# Patient Record
Sex: MALE | Race: BLACK OR AFRICAN AMERICAN | NOT HISPANIC OR LATINO | Employment: UNEMPLOYED | URBAN - METROPOLITAN AREA
[De-identification: names, ages, dates, MRNs, and addresses within clinical notes are randomized per-mention and may not be internally consistent; named-entity substitution may affect disease eponyms.]

---

## 2017-09-11 ENCOUNTER — TRANSCRIBE ORDERS (OUTPATIENT)
Dept: ADMINISTRATIVE | Facility: HOSPITAL | Age: 42
End: 2017-09-11

## 2017-09-11 DIAGNOSIS — R59.1 LYMPHADENOPATHY: Primary | ICD-10-CM

## 2020-10-13 ENCOUNTER — HOSPITAL ENCOUNTER (OUTPATIENT)
Facility: HOSPITAL | Age: 45
Setting detail: OBSERVATION
Discharge: HOME | End: 2020-10-14
Attending: EMERGENCY MEDICINE | Admitting: INTERNAL MEDICINE

## 2020-10-13 DIAGNOSIS — I25.10 ATHEROSCLEROTIC HEART DISEASE OF NATIVE CORONARY ARTERY WITHOUT ANGINA PECTORIS: ICD-10-CM

## 2020-10-13 DIAGNOSIS — R42 VERTIGO: Primary | ICD-10-CM

## 2020-10-13 DIAGNOSIS — I10 ESSENTIAL (PRIMARY) HYPERTENSION: ICD-10-CM

## 2020-10-13 DIAGNOSIS — I50.22 CHRONIC SYSTOLIC CONGESTIVE HEART FAILURE (HCC): ICD-10-CM

## 2020-10-13 DIAGNOSIS — I50.9 CHF (CONGESTIVE HEART FAILURE) (HCC): ICD-10-CM

## 2020-10-13 PROCEDURE — 99285 EMERGENCY DEPT VISIT HI MDM: CPT

## 2020-10-13 PROCEDURE — 93005 ELECTROCARDIOGRAM TRACING: CPT

## 2020-10-13 RX ORDER — ONDANSETRON 2 MG/ML
1 INJECTION INTRAMUSCULAR; INTRAVENOUS ONCE
Status: COMPLETED | OUTPATIENT
Start: 2020-10-14 | End: 2020-10-14

## 2020-10-13 RX ORDER — METOPROLOL TARTRATE 50 MG/1
TABLET, FILM COATED ORAL
COMMUNITY
End: 2020-10-14

## 2020-10-13 RX ORDER — ATORVASTATIN CALCIUM 10 MG/1
TABLET, FILM COATED ORAL
COMMUNITY
End: 2020-10-14

## 2020-10-13 RX ORDER — CLOPIDOGREL BISULFATE 75 MG/1
TABLET ORAL
COMMUNITY
End: 2020-10-14

## 2020-10-13 RX ORDER — CYCLOBENZAPRINE HCL 10 MG
TABLET ORAL
COMMUNITY
End: 2020-10-14

## 2020-10-14 ENCOUNTER — APPOINTMENT (OUTPATIENT)
Dept: RADIOLOGY | Facility: HOSPITAL | Age: 45
End: 2020-10-14

## 2020-10-14 ENCOUNTER — APPOINTMENT (OUTPATIENT)
Dept: NON INVASIVE DIAGNOSTICS | Facility: HOSPITAL | Age: 45
End: 2020-10-14

## 2020-10-14 VITALS
OXYGEN SATURATION: 98 % | TEMPERATURE: 97.4 F | DIASTOLIC BLOOD PRESSURE: 85 MMHG | RESPIRATION RATE: 25 BRPM | BODY MASS INDEX: 32.93 KG/M2 | WEIGHT: 230 LBS | SYSTOLIC BLOOD PRESSURE: 136 MMHG | HEIGHT: 70 IN | HEART RATE: 94 BPM

## 2020-10-14 PROBLEM — I42.8 NONISCHEMIC CARDIOMYOPATHY (HCC): Status: ACTIVE | Noted: 2019-04-12

## 2020-10-14 PROBLEM — E78.2 MIXED HYPERLIPIDEMIA: Status: ACTIVE | Noted: 2019-04-12

## 2020-10-14 PROBLEM — R42 DIZZINESS: Status: RESOLVED | Noted: 2020-10-14 | Resolved: 2020-10-14

## 2020-10-14 PROBLEM — I25.10 ATHEROSCLEROTIC HEART DISEASE OF NATIVE CORONARY ARTERY WITHOUT ANGINA PECTORIS: Status: ACTIVE | Noted: 2019-04-12

## 2020-10-14 PROBLEM — R77.8 TROPONIN LEVEL ELEVATED: Status: RESOLVED | Noted: 2020-10-14 | Resolved: 2020-10-14

## 2020-10-14 PROBLEM — R42 DIZZINESS: Status: ACTIVE | Noted: 2020-10-14

## 2020-10-14 PROBLEM — R77.8 TROPONIN LEVEL ELEVATED: Status: ACTIVE | Noted: 2020-10-14

## 2020-10-14 PROBLEM — I50.9 CHF (CONGESTIVE HEART FAILURE) (HCC): Status: ACTIVE | Noted: 2019-04-19

## 2020-10-14 PROBLEM — I50.22 CHRONIC SYSTOLIC CONGESTIVE HEART FAILURE (HCC): Status: ACTIVE | Noted: 2019-04-12

## 2020-10-14 PROBLEM — I10 ESSENTIAL (PRIMARY) HYPERTENSION: Status: ACTIVE | Noted: 2019-04-12

## 2020-10-14 LAB
ALBUMIN SERPL BCP-MCNC: 3.8 G/DL (ref 3.5–5)
ALP SERPL-CCNC: 102 U/L (ref 46–116)
ALT SERPL W P-5'-P-CCNC: 29 U/L (ref 12–78)
ANION GAP SERPL CALCULATED.3IONS-SCNC: 5 MMOL/L (ref 4–13)
ANION GAP SERPL CALCULATED.3IONS-SCNC: 7 MMOL/L (ref 4–13)
AST SERPL W P-5'-P-CCNC: 24 U/L (ref 5–45)
ATRIAL RATE: 76 BPM
ATRIAL RATE: 88 BPM
BASOPHILS # BLD AUTO: 0.04 THOUSANDS/ΜL (ref 0–0.1)
BASOPHILS NFR BLD AUTO: 0 % (ref 0–1)
BILIRUB SERPL-MCNC: 0.7 MG/DL (ref 0.2–1)
BUN SERPL-MCNC: 10 MG/DL (ref 5–25)
BUN SERPL-MCNC: 12 MG/DL (ref 5–25)
CALCIUM SERPL-MCNC: 8.7 MG/DL (ref 8.3–10.1)
CALCIUM SERPL-MCNC: 9 MG/DL (ref 8.3–10.1)
CHLORIDE SERPL-SCNC: 107 MMOL/L (ref 100–108)
CHLORIDE SERPL-SCNC: 108 MMOL/L (ref 100–108)
CO2 SERPL-SCNC: 27 MMOL/L (ref 21–32)
CO2 SERPL-SCNC: 27 MMOL/L (ref 21–32)
CREAT SERPL-MCNC: 1.13 MG/DL (ref 0.6–1.3)
CREAT SERPL-MCNC: 1.26 MG/DL (ref 0.6–1.3)
EOSINOPHIL # BLD AUTO: 0.1 THOUSAND/ΜL (ref 0–0.61)
EOSINOPHIL NFR BLD AUTO: 1 % (ref 0–6)
ERYTHROCYTE [DISTWIDTH] IN BLOOD BY AUTOMATED COUNT: 14 % (ref 11.6–15.1)
ERYTHROCYTE [DISTWIDTH] IN BLOOD BY AUTOMATED COUNT: 14 % (ref 11.6–15.1)
GFR SERPL CREATININE-BSD FRML MDRD: 78 ML/MIN/1.73SQ M
GFR SERPL CREATININE-BSD FRML MDRD: 89 ML/MIN/1.73SQ M
GLUCOSE SERPL-MCNC: 100 MG/DL (ref 65–140)
GLUCOSE SERPL-MCNC: 154 MG/DL (ref 65–140)
HCT VFR BLD AUTO: 46.8 % (ref 36.5–49.3)
HCT VFR BLD AUTO: 52.1 % (ref 36.5–49.3)
HGB BLD-MCNC: 15.7 G/DL (ref 12–17)
HGB BLD-MCNC: 16.8 G/DL (ref 12–17)
IMM GRANULOCYTES # BLD AUTO: 0.04 THOUSAND/UL (ref 0–0.2)
IMM GRANULOCYTES NFR BLD AUTO: 0 % (ref 0–2)
LYMPHOCYTES # BLD AUTO: 1.79 THOUSANDS/ΜL (ref 0.6–4.47)
LYMPHOCYTES NFR BLD AUTO: 20 % (ref 14–44)
MAGNESIUM SERPL-MCNC: 2.1 MG/DL (ref 1.6–2.6)
MCH RBC QN AUTO: 31.9 PG (ref 26.8–34.3)
MCH RBC QN AUTO: 32.2 PG (ref 26.8–34.3)
MCHC RBC AUTO-ENTMCNC: 32.2 G/DL (ref 31.4–37.4)
MCHC RBC AUTO-ENTMCNC: 33.5 G/DL (ref 31.4–37.4)
MCV RBC AUTO: 96 FL (ref 82–98)
MCV RBC AUTO: 99 FL (ref 82–98)
MONOCYTES # BLD AUTO: 0.82 THOUSAND/ΜL (ref 0.17–1.22)
MONOCYTES NFR BLD AUTO: 9 % (ref 4–12)
NEUTROPHILS # BLD AUTO: 6.2 THOUSANDS/ΜL (ref 1.85–7.62)
NEUTS SEG NFR BLD AUTO: 70 % (ref 43–75)
NRBC BLD AUTO-RTO: 0 /100 WBCS
P AXIS: 66 DEGREES
P AXIS: 72 DEGREES
PLATELET # BLD AUTO: 216 THOUSANDS/UL (ref 149–390)
PLATELET # BLD AUTO: 227 THOUSANDS/UL (ref 149–390)
PMV BLD AUTO: 9.2 FL (ref 8.9–12.7)
PMV BLD AUTO: 9.8 FL (ref 8.9–12.7)
POTASSIUM SERPL-SCNC: 3.4 MMOL/L (ref 3.5–5.3)
POTASSIUM SERPL-SCNC: 3.7 MMOL/L (ref 3.5–5.3)
PR INTERVAL: 176 MS
PR INTERVAL: 188 MS
PROT SERPL-MCNC: 8.2 G/DL (ref 6.4–8.2)
QRS AXIS: -61 DEGREES
QRS AXIS: 228 DEGREES
QRSD INTERVAL: 106 MS
QRSD INTERVAL: 112 MS
QT INTERVAL: 362 MS
QT INTERVAL: 390 MS
QTC INTERVAL: 438 MS
QTC INTERVAL: 438 MS
RBC # BLD AUTO: 4.87 MILLION/UL (ref 3.88–5.62)
RBC # BLD AUTO: 5.27 MILLION/UL (ref 3.88–5.62)
SODIUM SERPL-SCNC: 140 MMOL/L (ref 136–145)
SODIUM SERPL-SCNC: 141 MMOL/L (ref 136–145)
T WAVE AXIS: 112 DEGREES
T WAVE AXIS: 127 DEGREES
TROPONIN I SERPL-MCNC: 0.12 NG/ML
TROPONIN I SERPL-MCNC: 0.17 NG/ML
VENTRICULAR RATE: 76 BPM
VENTRICULAR RATE: 88 BPM
WBC # BLD AUTO: 8.32 THOUSAND/UL (ref 4.31–10.16)
WBC # BLD AUTO: 8.99 THOUSAND/UL (ref 4.31–10.16)

## 2020-10-14 PROCEDURE — 80048 BASIC METABOLIC PNL TOTAL CA: CPT | Performed by: INTERNAL MEDICINE

## 2020-10-14 PROCEDURE — 93306 TTE W/DOPPLER COMPLETE: CPT

## 2020-10-14 PROCEDURE — 85027 COMPLETE CBC AUTOMATED: CPT | Performed by: INTERNAL MEDICINE

## 2020-10-14 PROCEDURE — 93306 TTE W/DOPPLER COMPLETE: CPT | Performed by: INTERNAL MEDICINE

## 2020-10-14 PROCEDURE — 93005 ELECTROCARDIOGRAM TRACING: CPT

## 2020-10-14 PROCEDURE — 80053 COMPREHEN METABOLIC PANEL: CPT | Performed by: STUDENT IN AN ORGANIZED HEALTH CARE EDUCATION/TRAINING PROGRAM

## 2020-10-14 PROCEDURE — 99285 EMERGENCY DEPT VISIT HI MDM: CPT | Performed by: EMERGENCY MEDICINE

## 2020-10-14 PROCEDURE — 99244 OFF/OP CNSLTJ NEW/EST MOD 40: CPT | Performed by: INTERNAL MEDICINE

## 2020-10-14 PROCEDURE — 93010 ELECTROCARDIOGRAM REPORT: CPT | Performed by: INTERNAL MEDICINE

## 2020-10-14 PROCEDURE — 85025 COMPLETE CBC W/AUTO DIFF WBC: CPT | Performed by: STUDENT IN AN ORGANIZED HEALTH CARE EDUCATION/TRAINING PROGRAM

## 2020-10-14 PROCEDURE — 36415 COLL VENOUS BLD VENIPUNCTURE: CPT | Performed by: STUDENT IN AN ORGANIZED HEALTH CARE EDUCATION/TRAINING PROGRAM

## 2020-10-14 PROCEDURE — 83735 ASSAY OF MAGNESIUM: CPT | Performed by: INTERNAL MEDICINE

## 2020-10-14 PROCEDURE — 84484 ASSAY OF TROPONIN QUANT: CPT | Performed by: INTERNAL MEDICINE

## 2020-10-14 PROCEDURE — NC001 PR NO CHARGE: Performed by: INTERNAL MEDICINE

## 2020-10-14 PROCEDURE — 84484 ASSAY OF TROPONIN QUANT: CPT | Performed by: STUDENT IN AN ORGANIZED HEALTH CARE EDUCATION/TRAINING PROGRAM

## 2020-10-14 PROCEDURE — 99219 PR INITIAL OBSERVATION CARE/DAY 50 MINUTES: CPT | Performed by: INTERNAL MEDICINE

## 2020-10-14 PROCEDURE — 71045 X-RAY EXAM CHEST 1 VIEW: CPT

## 2020-10-14 RX ORDER — POTASSIUM CHLORIDE 20 MEQ/1
40 TABLET, EXTENDED RELEASE ORAL ONCE
Status: COMPLETED | OUTPATIENT
Start: 2020-10-14 | End: 2020-10-14

## 2020-10-14 RX ORDER — FUROSEMIDE 20 MG/1
20 TABLET ORAL DAILY
Qty: 30 TABLET | Refills: 0 | Status: ON HOLD | OUTPATIENT
Start: 2020-10-15 | End: 2021-03-24 | Stop reason: SDUPTHER

## 2020-10-14 RX ORDER — FUROSEMIDE 20 MG/1
20 TABLET ORAL DAILY
Status: DISCONTINUED | OUTPATIENT
Start: 2020-10-14 | End: 2020-10-14 | Stop reason: HOSPADM

## 2020-10-14 RX ORDER — LABETALOL 20 MG/4 ML (5 MG/ML) INTRAVENOUS SYRINGE
10 EVERY 6 HOURS PRN
Status: DISCONTINUED | OUTPATIENT
Start: 2020-10-14 | End: 2020-10-14 | Stop reason: HOSPADM

## 2020-10-14 RX ORDER — METOPROLOL TARTRATE 50 MG/1
50 TABLET, FILM COATED ORAL EVERY 12 HOURS SCHEDULED
Qty: 60 TABLET | Refills: 0 | Status: SHIPPED | OUTPATIENT
Start: 2020-10-14

## 2020-10-14 RX ORDER — ASPIRIN 81 MG/1
81 TABLET ORAL DAILY
Status: DISCONTINUED | OUTPATIENT
Start: 2020-10-14 | End: 2020-10-14 | Stop reason: HOSPADM

## 2020-10-14 RX ORDER — ASPIRIN 81 MG/1
81 TABLET ORAL DAILY
Qty: 30 TABLET | Refills: 1 | Status: ON HOLD | OUTPATIENT
Start: 2020-10-15 | End: 2021-03-31 | Stop reason: SDUPTHER

## 2020-10-14 RX ORDER — CLOPIDOGREL BISULFATE 75 MG/1
75 TABLET ORAL DAILY
Qty: 30 TABLET | Refills: 0 | Status: SHIPPED | OUTPATIENT
Start: 2020-10-15

## 2020-10-14 RX ORDER — LISINOPRIL 10 MG/1
10 TABLET ORAL DAILY
Qty: 30 TABLET | Refills: 0 | Status: SHIPPED | OUTPATIENT
Start: 2020-10-15 | End: 2021-03-31 | Stop reason: HOSPADM

## 2020-10-14 RX ORDER — ATORVASTATIN CALCIUM 10 MG/1
10 TABLET, FILM COATED ORAL
Status: DISCONTINUED | OUTPATIENT
Start: 2020-10-14 | End: 2020-10-14 | Stop reason: HOSPADM

## 2020-10-14 RX ORDER — ATORVASTATIN CALCIUM 10 MG/1
10 TABLET, FILM COATED ORAL
Qty: 30 TABLET | Refills: 0 | Status: SHIPPED | OUTPATIENT
Start: 2020-10-14

## 2020-10-14 RX ORDER — CLOPIDOGREL BISULFATE 75 MG/1
75 TABLET ORAL DAILY
Status: DISCONTINUED | OUTPATIENT
Start: 2020-10-14 | End: 2020-10-14 | Stop reason: HOSPADM

## 2020-10-14 RX ORDER — METOPROLOL SUCCINATE 50 MG/1
50 TABLET, EXTENDED RELEASE ORAL DAILY
Status: DISCONTINUED | OUTPATIENT
Start: 2020-10-14 | End: 2020-10-14 | Stop reason: HOSPADM

## 2020-10-14 RX ORDER — LISINOPRIL 10 MG/1
10 TABLET ORAL DAILY
Status: DISCONTINUED | OUTPATIENT
Start: 2020-10-14 | End: 2020-10-14 | Stop reason: HOSPADM

## 2020-10-14 RX ADMIN — CLOPIDOGREL BISULFATE 75 MG: 75 TABLET ORAL at 12:44

## 2020-10-14 RX ADMIN — POTASSIUM CHLORIDE 40 MEQ: 1500 TABLET, EXTENDED RELEASE ORAL at 03:06

## 2020-10-14 RX ADMIN — FUROSEMIDE 20 MG: 20 TABLET ORAL at 12:45

## 2020-10-14 RX ADMIN — ASPIRIN 81 MG: 81 TABLET ORAL at 12:44

## 2020-10-14 RX ADMIN — POTASSIUM CHLORIDE 40 MEQ: 1500 TABLET, EXTENDED RELEASE ORAL at 08:00

## 2020-10-14 RX ADMIN — METOPROLOL SUCCINATE 50 MG: 50 TABLET, EXTENDED RELEASE ORAL at 12:45

## 2020-10-14 RX ADMIN — LISINOPRIL 10 MG: 10 TABLET ORAL at 12:45

## 2021-03-24 ENCOUNTER — APPOINTMENT (EMERGENCY)
Dept: RADIOLOGY | Facility: HOSPITAL | Age: 46
DRG: 251 | End: 2021-03-24
Payer: COMMERCIAL

## 2021-03-24 ENCOUNTER — APPOINTMENT (EMERGENCY)
Dept: CT IMAGING | Facility: HOSPITAL | Age: 46
DRG: 251 | End: 2021-03-24
Payer: COMMERCIAL

## 2021-03-24 ENCOUNTER — APPOINTMENT (EMERGENCY)
Dept: ULTRASOUND IMAGING | Facility: HOSPITAL | Age: 46
DRG: 251 | End: 2021-03-24
Payer: COMMERCIAL

## 2021-03-24 ENCOUNTER — HOSPITAL ENCOUNTER (INPATIENT)
Facility: HOSPITAL | Age: 46
LOS: 1 days | Discharge: HOME | DRG: 251 | End: 2021-03-24
Attending: EMERGENCY MEDICINE | Admitting: INTERNAL MEDICINE
Payer: COMMERCIAL

## 2021-03-24 VITALS
SYSTOLIC BLOOD PRESSURE: 114 MMHG | BODY MASS INDEX: 36.3 KG/M2 | HEART RATE: 86 BPM | WEIGHT: 253.53 LBS | RESPIRATION RATE: 18 BRPM | HEIGHT: 70 IN | OXYGEN SATURATION: 98 % | TEMPERATURE: 96.3 F | DIASTOLIC BLOOD PRESSURE: 63 MMHG

## 2021-03-24 DIAGNOSIS — N17.9 AKI (ACUTE KIDNEY INJURY) (HCC): ICD-10-CM

## 2021-03-24 DIAGNOSIS — I50.9 CHF (CONGESTIVE HEART FAILURE) (HCC): ICD-10-CM

## 2021-03-24 DIAGNOSIS — R77.8 ELEVATED TROPONIN I LEVEL: ICD-10-CM

## 2021-03-24 DIAGNOSIS — R10.9 ABDOMINAL PAIN: Primary | ICD-10-CM

## 2021-03-24 DIAGNOSIS — I50.22 CHRONIC SYSTOLIC CONGESTIVE HEART FAILURE (HCC): ICD-10-CM

## 2021-03-24 LAB
ALBUMIN SERPL BCP-MCNC: 3 G/DL (ref 3.4–4.8)
ALP SERPL-CCNC: 53.1 U/L (ref 10–129)
ALT SERPL W P-5'-P-CCNC: 30 U/L (ref 5–63)
ANION GAP SERPL CALCULATED.3IONS-SCNC: 8 MMOL/L (ref 4–13)
AST SERPL W P-5'-P-CCNC: 25 U/L (ref 15–41)
BASOPHILS # BLD AUTO: 0.03 THOUSANDS/ΜL (ref 0–0.1)
BASOPHILS NFR BLD AUTO: 1 % (ref 0–1)
BILIRUB SERPL-MCNC: 1.27 MG/DL (ref 0.3–1.2)
BUN SERPL-MCNC: 14 MG/DL (ref 6–20)
CALCIUM ALBUM COR SERPL-MCNC: 9.4 MG/DL (ref 8.3–10.1)
CALCIUM SERPL-MCNC: 8.6 MG/DL (ref 8.4–10.2)
CHLORIDE SERPL-SCNC: 103 MMOL/L (ref 96–108)
CHOLEST SERPL-MCNC: 117 MG/DL
CO2 SERPL-SCNC: 29 MMOL/L (ref 22–33)
CREAT SERPL-MCNC: 1.52 MG/DL (ref 0.5–1.2)
EOSINOPHIL # BLD AUTO: 0.02 THOUSAND/ΜL (ref 0–0.61)
EOSINOPHIL NFR BLD AUTO: 0 % (ref 0–6)
ERYTHROCYTE [DISTWIDTH] IN BLOOD BY AUTOMATED COUNT: 15 % (ref 11.6–15.1)
EST. AVERAGE GLUCOSE BLD GHB EST-MCNC: 128 MG/DL
GFR SERPL CREATININE-BSD FRML MDRD: 62 ML/MIN/1.73SQ M
GLUCOSE SERPL-MCNC: 135 MG/DL (ref 65–140)
HBA1C MFR BLD: 6.1 %
HCT VFR BLD AUTO: 42.4 % (ref 36.5–49.3)
HDLC SERPL-MCNC: 27 MG/DL
HGB BLD-MCNC: 13.7 G/DL (ref 12–17)
IMM GRANULOCYTES # BLD AUTO: 0.01 THOUSAND/UL (ref 0–0.2)
IMM GRANULOCYTES NFR BLD AUTO: 0 % (ref 0–2)
LACTATE SERPL-SCNC: 1.7 MMOL/L (ref 0–2)
LDLC SERPL CALC-MCNC: 76 MG/DL (ref 0–100)
LIPASE SERPL-CCNC: 16 U/L (ref 13–60)
LYMPHOCYTES # BLD AUTO: 1.37 THOUSANDS/ΜL (ref 0.6–4.47)
LYMPHOCYTES NFR BLD AUTO: 27 % (ref 14–44)
MAGNESIUM SERPL-MCNC: 1.9 MG/DL (ref 1.6–2.6)
MCH RBC QN AUTO: 30.9 PG (ref 26.8–34.3)
MCHC RBC AUTO-ENTMCNC: 32.3 G/DL (ref 31.4–37.4)
MCV RBC AUTO: 96 FL (ref 82–98)
MONOCYTES # BLD AUTO: 0.84 THOUSAND/ΜL (ref 0.17–1.22)
MONOCYTES NFR BLD AUTO: 17 % (ref 4–12)
NEUTROPHILS # BLD AUTO: 2.78 THOUSANDS/ΜL (ref 1.85–7.62)
NEUTS SEG NFR BLD AUTO: 55 % (ref 43–75)
NT-PROBNP SERPL-MCNC: 8436 PG/ML
PLATELET # BLD AUTO: 189 THOUSANDS/UL (ref 149–390)
PMV BLD AUTO: 10.3 FL (ref 8.9–12.7)
POTASSIUM SERPL-SCNC: 3.5 MMOL/L (ref 3.5–5)
PROT SERPL-MCNC: 6.7 G/DL (ref 6.4–8.3)
RBC # BLD AUTO: 4.44 MILLION/UL (ref 3.88–5.62)
SODIUM SERPL-SCNC: 140 MMOL/L (ref 133–145)
TRIGL SERPL-MCNC: 67.7 MG/DL
TROPONIN I SERPL-MCNC: 0.12 NG/ML (ref 0–0.07)
TROPONIN I SERPL-MCNC: 0.13 NG/ML (ref 0–0.07)
WBC # BLD AUTO: 5.05 THOUSAND/UL (ref 4.31–10.16)

## 2021-03-24 PROCEDURE — 93005 ELECTROCARDIOGRAM TRACING: CPT

## 2021-03-24 PROCEDURE — 96361 HYDRATE IV INFUSION ADD-ON: CPT

## 2021-03-24 PROCEDURE — 85025 COMPLETE CBC W/AUTO DIFF WBC: CPT | Performed by: EMERGENCY MEDICINE

## 2021-03-24 PROCEDURE — 71045 X-RAY EXAM CHEST 1 VIEW: CPT

## 2021-03-24 PROCEDURE — 36415 COLL VENOUS BLD VENIPUNCTURE: CPT | Performed by: EMERGENCY MEDICINE

## 2021-03-24 PROCEDURE — 80053 COMPREHEN METABOLIC PANEL: CPT | Performed by: EMERGENCY MEDICINE

## 2021-03-24 PROCEDURE — 76705 ECHO EXAM OF ABDOMEN: CPT

## 2021-03-24 PROCEDURE — 96375 TX/PRO/DX INJ NEW DRUG ADDON: CPT

## 2021-03-24 PROCEDURE — C9113 INJ PANTOPRAZOLE SODIUM, VIA: HCPCS | Performed by: INTERNAL MEDICINE

## 2021-03-24 PROCEDURE — 83690 ASSAY OF LIPASE: CPT | Performed by: EMERGENCY MEDICINE

## 2021-03-24 PROCEDURE — 83880 ASSAY OF NATRIURETIC PEPTIDE: CPT | Performed by: INTERNAL MEDICINE

## 2021-03-24 PROCEDURE — 99285 EMERGENCY DEPT VISIT HI MDM: CPT | Performed by: EMERGENCY MEDICINE

## 2021-03-24 PROCEDURE — 80061 LIPID PANEL: CPT | Performed by: INTERNAL MEDICINE

## 2021-03-24 PROCEDURE — 83735 ASSAY OF MAGNESIUM: CPT | Performed by: INTERNAL MEDICINE

## 2021-03-24 PROCEDURE — 83036 HEMOGLOBIN GLYCOSYLATED A1C: CPT | Performed by: INTERNAL MEDICINE

## 2021-03-24 PROCEDURE — 99222 1ST HOSP IP/OBS MODERATE 55: CPT | Performed by: INTERNAL MEDICINE

## 2021-03-24 PROCEDURE — 84484 ASSAY OF TROPONIN QUANT: CPT | Performed by: INTERNAL MEDICINE

## 2021-03-24 PROCEDURE — 99285 EMERGENCY DEPT VISIT HI MDM: CPT

## 2021-03-24 PROCEDURE — 83605 ASSAY OF LACTIC ACID: CPT | Performed by: EMERGENCY MEDICINE

## 2021-03-24 PROCEDURE — 96374 THER/PROPH/DIAG INJ IV PUSH: CPT

## 2021-03-24 PROCEDURE — 99254 IP/OBS CNSLTJ NEW/EST MOD 60: CPT | Performed by: INTERNAL MEDICINE

## 2021-03-24 PROCEDURE — 84484 ASSAY OF TROPONIN QUANT: CPT | Performed by: EMERGENCY MEDICINE

## 2021-03-24 RX ORDER — ONDANSETRON 2 MG/ML
4 INJECTION INTRAMUSCULAR; INTRAVENOUS ONCE
Status: COMPLETED | OUTPATIENT
Start: 2021-03-24 | End: 2021-03-24

## 2021-03-24 RX ORDER — PANTOPRAZOLE SODIUM 40 MG/1
40 INJECTION, POWDER, FOR SOLUTION INTRAVENOUS EVERY 12 HOURS SCHEDULED
Status: DISCONTINUED | OUTPATIENT
Start: 2021-03-24 | End: 2021-03-24 | Stop reason: HOSPADM

## 2021-03-24 RX ORDER — SODIUM CHLORIDE, SODIUM GLUCONATE, SODIUM ACETATE, POTASSIUM CHLORIDE, MAGNESIUM CHLORIDE, SODIUM PHOSPHATE, DIBASIC, AND POTASSIUM PHOSPHATE .53; .5; .37; .037; .03; .012; .00082 G/100ML; G/100ML; G/100ML; G/100ML; G/100ML; G/100ML; G/100ML
50 INJECTION, SOLUTION INTRAVENOUS CONTINUOUS
Status: DISCONTINUED | OUTPATIENT
Start: 2021-03-24 | End: 2021-03-24

## 2021-03-24 RX ORDER — NITROGLYCERIN 0.4 MG/1
0.4 TABLET SUBLINGUAL
Status: DISCONTINUED | OUTPATIENT
Start: 2021-03-24 | End: 2021-03-24 | Stop reason: HOSPADM

## 2021-03-24 RX ORDER — SIMETHICONE 80 MG
80 TABLET,CHEWABLE ORAL 4 TIMES DAILY PRN
Status: DISCONTINUED | OUTPATIENT
Start: 2021-03-24 | End: 2021-03-24 | Stop reason: HOSPADM

## 2021-03-24 RX ORDER — HYDROMORPHONE HCL/PF 1 MG/ML
0.5 SYRINGE (ML) INJECTION
Status: DISCONTINUED | OUTPATIENT
Start: 2021-03-24 | End: 2021-03-24 | Stop reason: HOSPADM

## 2021-03-24 RX ORDER — OXYCODONE HYDROCHLORIDE 5 MG/1
5 TABLET ORAL EVERY 4 HOURS PRN
Status: DISCONTINUED | OUTPATIENT
Start: 2021-03-24 | End: 2021-03-24 | Stop reason: HOSPADM

## 2021-03-24 RX ORDER — SENNOSIDES 8.6 MG
1 TABLET ORAL
Status: DISCONTINUED | OUTPATIENT
Start: 2021-03-24 | End: 2021-03-24 | Stop reason: HOSPADM

## 2021-03-24 RX ORDER — ONDANSETRON 2 MG/ML
4 INJECTION INTRAMUSCULAR; INTRAVENOUS EVERY 6 HOURS PRN
Status: DISCONTINUED | OUTPATIENT
Start: 2021-03-24 | End: 2021-03-24

## 2021-03-24 RX ORDER — POTASSIUM CHLORIDE 1.5 G/1.77G
20 POWDER, FOR SOLUTION ORAL DAILY
Qty: 30 PACKET | Refills: 1 | Status: ON HOLD | OUTPATIENT
Start: 2021-03-24 | End: 2021-03-31 | Stop reason: SDUPTHER

## 2021-03-24 RX ORDER — METOPROLOL TARTRATE 50 MG/1
50 TABLET, FILM COATED ORAL EVERY 12 HOURS SCHEDULED
Status: DISCONTINUED | OUTPATIENT
Start: 2021-03-24 | End: 2021-03-24 | Stop reason: HOSPADM

## 2021-03-24 RX ORDER — BISACODYL 10 MG
10 SUPPOSITORY, RECTAL RECTAL DAILY PRN
Status: DISCONTINUED | OUTPATIENT
Start: 2021-03-24 | End: 2021-03-24 | Stop reason: HOSPADM

## 2021-03-24 RX ORDER — POTASSIUM CHLORIDE 20 MEQ/1
20 TABLET, EXTENDED RELEASE ORAL ONCE
Status: COMPLETED | OUTPATIENT
Start: 2021-03-24 | End: 2021-03-24

## 2021-03-24 RX ORDER — LISINOPRIL 10 MG/1
10 TABLET ORAL DAILY
Status: DISCONTINUED | OUTPATIENT
Start: 2021-03-24 | End: 2021-03-24

## 2021-03-24 RX ORDER — LIDOCAINE HYDROCHLORIDE 20 MG/ML
15 SOLUTION OROPHARYNGEAL ONCE
Status: COMPLETED | OUTPATIENT
Start: 2021-03-24 | End: 2021-03-24

## 2021-03-24 RX ORDER — ASPIRIN 81 MG/1
81 TABLET ORAL DAILY
Status: DISCONTINUED | OUTPATIENT
Start: 2021-03-24 | End: 2021-03-24 | Stop reason: HOSPADM

## 2021-03-24 RX ORDER — ATORVASTATIN CALCIUM 10 MG/1
10 TABLET, FILM COATED ORAL
Status: DISCONTINUED | OUTPATIENT
Start: 2021-03-24 | End: 2021-03-24 | Stop reason: HOSPADM

## 2021-03-24 RX ORDER — MAGNESIUM HYDROXIDE/ALUMINUM HYDROXICE/SIMETHICONE 120; 1200; 1200 MG/30ML; MG/30ML; MG/30ML
30 SUSPENSION ORAL EVERY 6 HOURS PRN
Status: DISCONTINUED | OUTPATIENT
Start: 2021-03-24 | End: 2021-03-24 | Stop reason: HOSPADM

## 2021-03-24 RX ORDER — FUROSEMIDE 20 MG/1
20 TABLET ORAL DAILY
Status: DISCONTINUED | OUTPATIENT
Start: 2021-03-24 | End: 2021-03-24

## 2021-03-24 RX ORDER — SODIUM CHLORIDE 9 MG/ML
125 INJECTION, SOLUTION INTRAVENOUS CONTINUOUS
Status: DISCONTINUED | OUTPATIENT
Start: 2021-03-24 | End: 2021-03-24

## 2021-03-24 RX ORDER — MAGNESIUM HYDROXIDE/ALUMINUM HYDROXICE/SIMETHICONE 120; 1200; 1200 MG/30ML; MG/30ML; MG/30ML
30 SUSPENSION ORAL ONCE
Status: COMPLETED | OUTPATIENT
Start: 2021-03-24 | End: 2021-03-24

## 2021-03-24 RX ORDER — OXYCODONE HYDROCHLORIDE 10 MG/1
10 TABLET ORAL EVERY 4 HOURS PRN
Status: DISCONTINUED | OUTPATIENT
Start: 2021-03-24 | End: 2021-03-24 | Stop reason: HOSPADM

## 2021-03-24 RX ORDER — KETOROLAC TROMETHAMINE 30 MG/ML
30 INJECTION, SOLUTION INTRAMUSCULAR; INTRAVENOUS ONCE
Status: COMPLETED | OUTPATIENT
Start: 2021-03-24 | End: 2021-03-24

## 2021-03-24 RX ORDER — FAMOTIDINE 20 MG/1
20 TABLET, FILM COATED ORAL DAILY
Qty: 14 TABLET | Refills: 0 | Status: SHIPPED | OUTPATIENT
Start: 2021-03-24 | End: 2021-04-07

## 2021-03-24 RX ORDER — CLOPIDOGREL BISULFATE 75 MG/1
75 TABLET ORAL DAILY
Status: DISCONTINUED | OUTPATIENT
Start: 2021-03-24 | End: 2021-03-24 | Stop reason: HOSPADM

## 2021-03-24 RX ORDER — CLONIDINE HYDROCHLORIDE 0.1 MG/1
0.2 TABLET ORAL ONCE
Status: COMPLETED | OUTPATIENT
Start: 2021-03-24 | End: 2021-03-24

## 2021-03-24 RX ORDER — POTASSIUM CHLORIDE 20 MEQ/1
40 TABLET, EXTENDED RELEASE ORAL ONCE
Status: COMPLETED | OUTPATIENT
Start: 2021-03-24 | End: 2021-03-24

## 2021-03-24 RX ORDER — ACETAMINOPHEN 325 MG/1
650 TABLET ORAL EVERY 4 HOURS PRN
Status: DISCONTINUED | OUTPATIENT
Start: 2021-03-24 | End: 2021-03-24 | Stop reason: HOSPADM

## 2021-03-24 RX ORDER — FUROSEMIDE 20 MG/1
40 TABLET ORAL DAILY
Qty: 30 TABLET | Refills: 0 | Status: SHIPPED | OUTPATIENT
Start: 2021-03-24 | End: 2021-03-31 | Stop reason: HOSPADM

## 2021-03-24 RX ORDER — MAGNESIUM HYDROXIDE/ALUMINUM HYDROXICE/SIMETHICONE 120; 1200; 1200 MG/30ML; MG/30ML; MG/30ML
30 SUSPENSION ORAL EVERY 6 HOURS PRN
Qty: 355 ML | Refills: 0 | Status: SHIPPED | OUTPATIENT
Start: 2021-03-24 | End: 2021-03-31 | Stop reason: HOSPADM

## 2021-03-24 RX ORDER — HEPARIN SODIUM 5000 [USP'U]/ML
5000 INJECTION, SOLUTION INTRAVENOUS; SUBCUTANEOUS EVERY 8 HOURS SCHEDULED
Status: DISCONTINUED | OUTPATIENT
Start: 2021-03-24 | End: 2021-03-24 | Stop reason: HOSPADM

## 2021-03-24 RX ADMIN — ALUMINA, MAGNESIA, AND SIMETHICONE ORAL SUSPENSION REGULAR STRENGTH 30 ML: 1200; 1200; 120 SUSPENSION ORAL at 04:10

## 2021-03-24 RX ADMIN — POTASSIUM CHLORIDE 40 MEQ: 1500 TABLET, EXTENDED RELEASE ORAL at 09:08

## 2021-03-24 RX ADMIN — SODIUM CHLORIDE 125 ML/HR: 0.9 INJECTION, SOLUTION INTRAVENOUS at 02:16

## 2021-03-24 RX ADMIN — METOPROLOL TARTRATE 50 MG: 50 TABLET, FILM COATED ORAL at 09:08

## 2021-03-24 RX ADMIN — SODIUM CHLORIDE, SODIUM GLUCONATE, SODIUM ACETATE, POTASSIUM CHLORIDE, MAGNESIUM CHLORIDE, SODIUM PHOSPHATE, DIBASIC, AND POTASSIUM PHOSPHATE 50 ML/HR: .53; .5; .37; .037; .03; .012; .00082 INJECTION, SOLUTION INTRAVENOUS at 05:59

## 2021-03-24 RX ADMIN — CLOPIDOGREL BISULFATE 75 MG: 75 TABLET ORAL at 09:08

## 2021-03-24 RX ADMIN — ASPIRIN 81 MG: 81 TABLET, DELAYED RELEASE ORAL at 09:08

## 2021-03-24 RX ADMIN — KETOROLAC TROMETHAMINE 30 MG: 30 INJECTION, SOLUTION INTRAMUSCULAR at 02:27

## 2021-03-24 RX ADMIN — HEPARIN SODIUM 5000 UNITS: 5000 INJECTION INTRAVENOUS; SUBCUTANEOUS at 06:09

## 2021-03-24 RX ADMIN — CLONIDINE HYDROCHLORIDE 0.2 MG: 0.1 TABLET ORAL at 02:22

## 2021-03-24 RX ADMIN — PANTOPRAZOLE SODIUM 40 MG: 40 INJECTION, POWDER, FOR SOLUTION INTRAVENOUS at 06:05

## 2021-03-24 RX ADMIN — LIDOCAINE HYDROCHLORIDE 15 ML: 20 SOLUTION ORAL; TOPICAL at 04:09

## 2021-03-24 RX ADMIN — ONDANSETRON 4 MG: 2 INJECTION INTRAMUSCULAR; INTRAVENOUS at 02:24

## 2021-03-24 RX ADMIN — POTASSIUM CHLORIDE 20 MEQ: 1500 TABLET, EXTENDED RELEASE ORAL at 06:09

## 2021-03-24 NOTE — DISCHARGE INSTR - AVS FIRST PAGE
Follow up your PCP in a week of discharge  Please call to set up appointment  Follow up with cardiology in a week of discharge  Please call to set up appointment  You are to repeat BMP on 3/30/21 to check your kidney function  Result should be sent to your new PCP  You should hold your home dose of lasix today and resume same at same dose by tomorrow

## 2021-03-24 NOTE — PLAN OF CARE
Problem: PAIN - ADULT  Goal: Verbalizes/displays adequate comfort level or baseline comfort level  Description: Interventions:  - Encourage patient to monitor pain and request assistance  - Assess pain using appropriate pain scale  - Administer analgesics based on type and severity of pain and evaluate response  - Implement non-pharmacological measures as appropriate and evaluate response  - Consider cultural and social influences on pain and pain management  - Notify physician/advanced practitioner if interventions unsuccessful or patient reports new pain  Outcome: Progressing     Problem: GASTROINTESTINAL - ADULT  Goal: Minimal or absence of nausea and/or vomiting  Description: INTERVENTIONS:  - Administer IV fluids if ordered to ensure adequate hydration  - Maintain NPO status until nausea and vomiting are resolved  - Nasogastric tube if ordered  - Administer ordered antiemetic medications as needed  - Provide nonpharmacologic comfort measures as appropriate  - Advance diet as tolerated, if ordered  - Consider nutrition services referral to assist patient with adequate nutrition and appropriate food choices  Outcome: Progressing  Goal: Maintains or returns to baseline bowel function  Description: INTERVENTIONS:  - Assess bowel function  - Encourage oral fluids to ensure adequate hydration  - Administer IV fluids if ordered to ensure adequate hydration  - Administer ordered medications as needed  - Encourage mobilization and activity  - Consider nutritional services referral to assist patient with adequate nutrition and appropriate food choices  Outcome: Progressing  Goal: Maintains adequate nutritional intake  Description: INTERVENTIONS:  - Monitor percentage of each meal consumed  - Identify factors contributing to decreased intake, treat as appropriate  - Assist with meals as needed  - Monitor I&O, weight, and lab values if indicated  - Obtain nutrition services referral as needed  Outcome: Progressing  Goal: Establish and maintain optimal ostomy function  Description: INTERVENTIONS:  - Assess bowel function  - Encourage oral fluids to ensure adequate hydration  - Administer IV fluids if ordered to ensure adequate hydration   - Administer ordered medications as needed  - Encourage mobilization and activity  - Nutrition services referral to assist patient with appropriate food choices  - Assess stoma site  - Consider wound care consult   Outcome: Progressing

## 2021-03-24 NOTE — ASSESSMENT & PLAN NOTE
Wt Readings from Last 3 Encounters:   03/24/21 116 kg (255 lb 4 7 oz)   10/13/20 104 kg (230 lb)     Volume status appears stable with only mild lower extremity edema  Patient denies any chest pain, shortness of breath, palpitations  BNP is pending  Echo 10/2020 showed The ventricle was markedly dilated  (7 1cm)Systolic function was severely reduced  Ejection fraction was estimated to be 20 %  There was severe diffuse hypokinesis    Has single chamber AICD, recommended device interrogation  Cardiology was consulted for further recommendations  Continue metoprolol 50 mg b i d , Plavix, aspirin, Lasix and lisinopril on hold due to DESHAWN

## 2021-03-24 NOTE — ED NOTES
Spoke to on call ultrasound tech who states she will be in to perform study        Sotero Royal, RN  03/24/21 0579

## 2021-03-24 NOTE — ED NOTES
Left message for [facility], on-call [place] tech on [phone]        Priya Samples  03/24/21 5957

## 2021-03-24 NOTE — ED PROCEDURE NOTE
PROCEDURE  ECG 12 Lead Documentation Only    Date/Time: 3/24/2021 2:00 AM  Performed by: Danielle Raphael MD  Authorized by:  Danielle Raphael MD     Indications / Diagnosis:  UPPER ABD PAIN HX CARDIAC DISEASE  ECG reviewed by me, the ED Provider: yes    Patient location:  ED and bedside  Previous ECG:     Previous ECG:  Unavailable  Interpretation:     Interpretation: abnormal    Rate:     ECG rate:  128    ECG rate assessment: tachycardic    Rhythm:     Rhythm: sinus tachycardia    Ectopy:     Ectopy: PVCs      PVCs:  Frequent  QRS:     QRS axis:  Normal    QRS intervals:  Normal  Conduction:     Conduction: abnormal      Abnormal conduction: LAFB    ST segments:     ST segments:  Normal  T waves:     T waves: normal    Other findings:     Other findings: poor R wave progression    Comments:      nO ACUTE ISCHEMIA OR INFARCTION         Danielle Raphael MD  03/24/21 9353

## 2021-03-24 NOTE — NURSING NOTE
Patient  Welcome for admission and made aware of the plan that  Troponin series be done,with ekg and he will be on heart monitoring but patient refused to be on monitoring,dr burns made aware of the  Pt refusal

## 2021-03-24 NOTE — ASSESSMENT & PLAN NOTE
Patient was given clonidine 0 2 mg once in the ED  Continue metoprolol 50 mg b i d   With holding parameters  Lisinopril on hold due to DESHAWN, consider adding amlodipine for BP control

## 2021-03-24 NOTE — ASSESSMENT & PLAN NOTE
Patient presenting with epigastric abdominal pain radiated to right upper quadrant associated with acid reflux and exacerbated by food intake  Patient takes aspirin on a daily basis, denies alcohol consumption  No blood in the stools or discoloration  Hemoglobin dropped from 15 to 13 since last admission  Total bilirubin noted to be mildly elevated  Ultrasound showed no evidence of cholelithiasis or cholecystitis  Hepatic and right renal cysts  Start on pantoprazole 40 mg IV b i d    Consult Gastroenterology for further recommendations/possible endoscopy

## 2021-03-24 NOTE — ASSESSMENT & PLAN NOTE
Initial troponin elevated, has been hospitalized previously with similar complaint  Trend troponin x3  Consult cardiology, appreciate recommendations

## 2021-03-24 NOTE — ED PROVIDER NOTES
History  Chief Complaint   Patient presents with    Abdominal Pain     reports mid upper epigastric pain that does not radiate  pt reports hurts when he eats      This is a 48year old male with a hx of CAD,AICD, cardiac arrest, HTN and hyperlipidemia that ambulated to the ED this morning reporting upper abd pain  Reports that he has had the epigastric pain for several months and that it worsens after eating  Reports that it is worse this evening  No vomiting - has nausea  Denies diarrhea or constipation  Hx MI and cardiac arrest - reports that this pain is not similar to the pain he had when he had the MI    No alleviating factors - reports that he tried baking soda in water with no relief    Denies hx of abd surgeries    Denies fever, chills or cough          Prior to Admission Medications   Prescriptions Last Dose Informant Patient Reported? Taking?   aspirin (ECOTRIN LOW STRENGTH) 81 mg EC tablet   No No   Sig: Take 1 tablet (81 mg total) by mouth daily   atorvastatin (LIPITOR) 10 mg tablet   No No   Sig: Take 1 tablet (10 mg total) by mouth daily with dinner   clopidogrel (PLAVIX) 75 mg tablet   No No   Sig: Take 1 tablet (75 mg total) by mouth daily   furosemide (Lasix) 20 mg tablet Not Taking at Unknown time  No No   Sig: Take 1 tablet (20 mg total) by mouth daily   Patient not taking: Reported on 3/24/2021   lisinopril (ZESTRIL) 10 mg tablet   No No   Sig: Take 1 tablet (10 mg total) by mouth daily   metoprolol tartrate (LOPRESSOR) 50 mg tablet   No No   Sig: Take 1 tablet (50 mg total) by mouth every 12 (twelve) hours      Facility-Administered Medications: None       Past Medical History:   Diagnosis Date    [facility]F [facility]congest[facility] heart failure) ([facility] [address] )     Hypertension     Pacemaker        History reviewed  No pertinent surgical history  History reviewed  No pertinent family history  I have reviewed and agree with the history as documented      E-Cigarette/Vaping    E-Cigarette Use Never User E-Cigarette/Vaping Substances     Social History     Tobacco Use    Smoking status: Never Smoker    Smokeless tobacco: Never Used   Substance Use Topics    Alcohol use: Not Currently    Drug use: Yes     Types: Marijuana       Review of Systems   Constitutional: Negative for activity change, appetite change, chills, diaphoresis, fatigue, fever and unexpected weight change  HENT: Negative for congestion, ear discharge, ear pain, mouth sores, sinus pressure, sinus pain, sneezing, sore throat, trouble swallowing and voice change  Eyes: Negative for photophobia, pain, discharge, redness, itching and visual disturbance  Respiratory: Negative for cough, chest tightness and shortness of breath  Cardiovascular: Negative for chest pain, palpitations and leg swelling  Gastrointestinal: Positive for abdominal pain (as per HPI - upper abd pain)  Negative for constipation, nausea and vomiting  Endocrine: Negative for cold intolerance, heat intolerance, polydipsia, polyphagia and polyuria  Genitourinary: Negative for decreased urine volume, difficulty urinating, dysuria, frequency, hematuria and urgency  Musculoskeletal: Negative for arthralgias, back pain, gait problem, joint swelling, myalgias, neck pain and neck stiffness  Skin: Negative for color change and rash  Allergic/Immunologic: Negative for immunocompromised state  Neurological: Negative for dizziness, tremors, seizures, syncope, speech difficulty, weakness, light-headedness, numbness and headaches  Hematological: Does not bruise/bleed easily  Psychiatric/Behavioral: Negative for behavioral problems and suicidal ideas  Physical Exam  Physical Exam  Vitals signs and nursing note reviewed  Constitutional:       General: He is not in acute distress  Appearance: Normal appearance  He is obese  He is not ill-appearing, toxic-appearing or diaphoretic  HENT:      Head: Normocephalic and atraumatic        Right Ear: Tympanic membrane, ear canal and external ear normal  There is no impacted cerumen  Left Ear: Tympanic membrane, ear canal and external ear normal  There is no impacted cerumen  Nose: No congestion or rhinorrhea  Mouth/Throat:      Mouth: Mucous membranes are moist       Pharynx: Oropharynx is clear  No oropharyngeal exudate or posterior oropharyngeal erythema  Eyes:      General: No scleral icterus  Right eye: No discharge  Left eye: No discharge  Extraocular Movements: Extraocular movements intact  Conjunctiva/sclera: Conjunctivae normal       Pupils: Pupils are equal, round, and reactive to light  Neck:      Musculoskeletal: Normal range of motion and neck supple  No neck rigidity or muscular tenderness  Vascular: No JVD  Trachea: No tracheal deviation  Cardiovascular:      Rate and Rhythm: Normal rate and regular rhythm  Heart sounds: Normal heart sounds  No murmur  Pulmonary:      Effort: Pulmonary effort is normal  No respiratory distress  Breath sounds: Normal breath sounds  No wheezing or rales  Chest:      Chest wall: No tenderness  Abdominal:      General: Abdomen is protuberant  Bowel sounds are normal       Palpations: Abdomen is soft  There is no mass  Tenderness: There is abdominal tenderness in the right upper quadrant and epigastric area  There is no right CVA tenderness, left CVA tenderness or guarding  Hernia: No hernia is present  Musculoskeletal: Normal range of motion  General: No swelling, tenderness, deformity or signs of injury  Right lower leg: No edema  Left lower leg: No edema  Lymphadenopathy:      Cervical: No cervical adenopathy  Skin:     General: Skin is warm and dry  Capillary Refill: Capillary refill takes less than 2 seconds  Findings: No bruising, erythema or rash  Neurological:      General: No focal deficit present        Mental Status: He is alert and oriented to person, place, and time  Mental status is at baseline  Cranial Nerves: No cranial nerve deficit  Sensory: No sensory deficit  Motor: No weakness or abnormal muscle tone  Coordination: Coordination normal       Gait: Gait normal       Deep Tendon Reflexes: Reflexes normal    Psychiatric:         Mood and Affect: Mood normal          Behavior: Behavior normal          Thought Content:  Thought content normal          Judgment: Judgment normal          Vital Signs  ED Triage Vitals   Temperature Pulse Respirations Blood Pressure SpO2   03/24/21 0151 03/24/21 0153 03/24/21 0153 03/24/21 0153 03/24/21 0153   98 1 °F (36 7 °C) 58 18 (!) 159/120 99 %      Temp Source Heart Rate Source Patient Position - Orthostatic VS BP Location FiO2 (%)   03/24/21 0151 03/24/21 0153 03/24/21 0153 03/24/21 0153 --   Oral Monitor Sitting Left arm       Pain Score       03/24/21 0153       7           Vitals:    03/24/21 0340 03/24/21 0441 03/24/21 0735 03/24/21 0908   BP: 142/98 122/98 102/55 114/63   Pulse:  100 88 86   Patient Position - Orthostatic VS: Lying Lying Lying          Visual Acuity      ED Medications  Medications   ondansetron (ZOFRAN) injection 4 mg (4 mg Intravenous Given 3/24/21 0224)   ketorolac (TORADOL) injection 30 mg (30 mg Intravenous Given 3/24/21 0227)   cloNIDine (CATAPRES) tablet 0 2 mg (0 2 mg Oral Given 3/24/21 0222)   Lidocaine Viscous HCl (XYLOCAINE) 2 % mucosal solution 15 mL (15 mL Swish & Swallow Given 3/24/21 0409)   aluminum-magnesium hydroxide-simethicone (MYLANTA) oral suspension 30 mL (30 mL Oral Given 3/24/21 0410)   potassium chloride (K-DUR,KLOR-CON) CR tablet 20 mEq (20 mEq Oral Given 3/24/21 0609)   potassium chloride (K-DUR,KLOR-CON) CR tablet 40 mEq (40 mEq Oral Given 3/24/21 0908)       Diagnostic Studies  Results Reviewed     Procedure Component Value Units Date/Time    Hemoglobin A1C w/ EAG Estimation [[id number]]  (Abnormal) Collected: 03/24/21 0208    Lab Status: Final result Specimen: Blood from Arm, Right Updated: 03/24/21 1537     Hemoglobin A1C 6 1 %       mg/dl     Lipid Panel with Direct LDL reflex [[id number]]  (Abnormal) Collected: 03/24/21 0208    Lab Status: Final result Specimen: Blood from Arm, Right Updated: 03/24/21 0645     Cholesterol 117 mg/dL      Triglycerides 67 7 mg/dL      HDL, Direct 27 mg/dL      LDL Calculated 76 mg/dL     Magnesium [[id number]]  (Normal) Collected: 03/24/21 0208    Lab Status: Final result Specimen: Blood from Arm, Right Updated: 03/24/21 0645     Magnesium 1 9 mg/dL     Troponin I [[id number]]  (Abnormal) Collected: 03/24/21 0208    Lab Status: Final result Specimen: Blood Updated: 03/24/21 0421     Troponin I 0 13 ng/mL     CMP [[id number]]  (Abnormal) Collected: 03/24/21 0208    Lab Status: Final result Specimen: Blood from Arm, Right Updated: 03/24/21 0238     Sodium 140 mmol/L      Potassium 3 5 mmol/L      Chloride 103 mmol/L      CO2 29 mmol/L      ANION GAP 8 mmol/L      BUN 14 mg/dL      Creatinine 1 52 mg/dL      Glucose 135 mg/dL      Calcium 8 6 mg/dL      Corrected Calcium 9 4 mg/dL      AST 25 U/L      ALT 30 U/L      Alkaline Phosphatase 53 1 U/L      Total Protein 6 7 g/dL      Albumin 3 0 g/dL      Total Bilirubin 1 27 mg/dL      eGFR 62 ml/min/1 73sq m     Narrative:      [place] guidelines for Chronic Kidney Disease (CKD):     Stage 1 with normal or high GFR (GFR > 90 mL/min/1 73 square meters)    Stage 2 Mild CKD (GFR = 60-89 mL/min/1 73 square meters)    Stage 3A Moderate CKD (GFR = 45-59 mL/min/1 73 square meters)    Stage 3B Moderate CKD (GFR = 30-44 mL/min/1 73 square meters)    Stage 4 Severe CKD (GFR = 15-29 mL/min/1 73 square meters)    Stage 5 End Stage CKD (GFR <15 mL/min/1 73 square meters)  Note: GFR calculation is accurate only with a steady state creatinine    Lipase [[id number]]  (Normal) Collected: 03/24/21 0208    Lab Status: Final result Specimen: Blood from Arm, Right Updated: 03/24/21 0238     Lipase 16 u/L     Lactic acid, plasma [[id number]]  (Normal) Collected: 03/24/21 0215    Lab Status: Final result Specimen: Blood from Arm, Right Updated: 03/24/21 0238     LACTIC ACID 1 7 mmol/L     Narrative:      Result may be elevated if tourniquet was used during collection  CBC and differential [[id number]]  (Abnormal) Collected: 03/24/21 0208    Lab Status: Final result Specimen: Blood from Arm, Right Updated: 03/24/21 0220     WBC 5 05 Thousand/uL      RBC 4 44 Million/uL      Hemoglobin 13 7 g/dL      Hematocrit 42 4 %      MCV 96 fL      MCH 30 9 pg      MCHC 32 3 g/dL      RDW 15 0 %      MPV 10 3 fL      Platelets 233 Thousands/uL      Neutrophils Relative 55 %      Immat GRANS % 0 %      Lymphocytes Relative 27 %      Monocytes Relative 17 %      Eosinophils Relative 0 %      Basophils Relative 1 %      Neutrophils Absolute 2 78 Thousands/µL      Immature Grans Absolute 0 01 Thousand/uL      Lymphocytes Absolute 1 37 Thousands/µL      Monocytes Absolute 0 84 Thousand/µL      Eosinophils Absolute 0 02 Thousand/µL      Basophils Absolute 0 03 Thousands/µL                  US right upper quadrant   Final Result by Laila Faulkner MD (03/24 0416)      1  No evidence of cholelithiasis or cholecystitis  2   Hepatic and right renal cysts  Workstation performed: [id number]         XR chest 1 view portable   Final Result by Kirsten Ross MD (03/24 0848)      Probable small left pleural effusion  Relative increased parenchymal density at the lung bases could represent atelectasis or developing pneumonia  Mild cardiomegaly           Workstation performed: [id number]                    Procedures  Procedures         ED Course  ED Course as of Mar 26 0232   Wed Mar 24, 2021   2779 Cancelled CT due to elevated creat of 1 52 - has poor cardiac output - ordered ultrasound      0430 Troponin I(!): 0 13   Fri Mar 26, 2021   0226 This is a 24-year-old male that presents emergency department with epigastric pain  Patient states he has had this pain intermittently for the past urine it is worse after he eats  I did not have any vomiting  Patient does have substantial cardiac history  Was concern for gastritis, gallbladder disease, pancreatitis or cardiac source  Lactic acid was normal 1 7  Lipase normal at 16  CBC normal with white count 5 05, hemoglobin 13 7, hematocrit 42 4 and platelets of 046  Troponin was elevated at 0 13         0227 The last trop available to compare with was from 5 months ago with a trop of 0 17 and then 0 12  He denies any chest pain  Frequent PVCs on the cardiac monitor and EKG which would be expected with a patient with an ejection fraction low enough to warrant insertion of an AICD          0229 Creatinine elevated at 1 52 - slowly hydrated pt with IV NS due to cardiomyopathy and hx of CHF - pt also reports that he "does not take his Lasix as often as it is ordered "   No acute findings on CT scan of the A/P which had to be performed without IV contrast due to decreased renal function  All imaging and/or lab testing discussed with patient  Patient and/or family members verbalizes understanding and agrees with plan for admission  Patient is stable for admission      Portions of the record may have been created with voice recognition software  Occasional wrong word or "sound a like" substitutions may have occurred due to the inherent limitations of voice recognition software  Read the chart carefully and recognize, using context, where substitutions have occurred  SBIRT 22yo+      Most Recent Value   SBIRT (22 yo +)   In order to provide better care to our patients, we are screening all of our patients for alcohol and drug use  Would it be okay to ask you these screening questions? Yes Filed at: 03/24/2021 0221   Initial Alcohol Screen: US AUDIT-C    1   How often do you have a drink containing alcohol?  0 Filed at: 03/24/2021 0221   2  How many drinks containing alcohol do you have on a typical day you are drinking? 0 Filed at: 03/24/2021 0221   3a  Male UNDER 65: How often do you have five or more drinks on one occasion? 0 Filed at: 03/24/2021 0221   3b  FEMALE Any Age, or MALE 65+: How often do you have 4 or more drinks on one occassion? 0 Filed at: 03/24/2021 0221   Audit-C Score  0 Filed at: 03/24/2021 0221   MADELYN: How many times in the past year have you    Used an illegal drug or used a prescription medication for non-medical reasons?   Never Filed at: 03/24/2021 0221                    MDM  Number of Diagnoses or Management Options  Abdominal pain: new and requires workup  DESHAWN (acute kidney injury) [facility]): new and requires workup  Elevated troponin I level: established and improving  Diagnosis management comments: Gallbladder disease, gastritis, MI, ACS,pancreatitis       Amount and/or Complexity of Data Reviewed  Clinical lab tests: ordered and reviewed  Tests in the radiology section of CPT®: ordered and reviewed  Obtain history from someone other than the patient: yes (Family member)  Independent visualization of images, tracings, or specimens: yes    Risk of Complications, Morbidity, and/or Mortality  Presenting problems: high  Diagnostic procedures: moderate  Management options: moderate    Patient Progress  Patient progress: improved      Disposition  Final diagnoses:   Abdominal pain   DESHAWN (acute kidney injury) ([facility] )   Elevated troponin I level     Time reflects when diagnosis was documented in both MDM as applicable and the Disposition within this note     Time User Action Codes Description Comment    3/24/2021  4:34 AM Zully Cui Add [R10 9] Abdominal pain     3/24/2021  4:35 AM Zully Cui Add [N17 9] DESHAWN (acute kidney injury) ([facility] [address] )     3/24/2021  4:35 AM Zully Cui Add [R77 8] Elevated troponin I level     3/24/2021  4:56 AM Nettie Johansen Add [I50 9] CHF (congestive heart failure) ([facility] )     3/24/2021  4:56 AM Nettie Johansen Add [M04 11] Chronic systolic congestive heart failure ([place] [address] )     3/24/2021  4:56 AM Nettie Johansen Add [I42 8] Nonischemic cardiomyopathy ([place] [address] )     3/24/2021  4:56 AM Nettie Johansen Remove [I42 8] Nonischemic cardiomyopathy [facility])       ED Disposition     ED Disposition Condition Date/Time Comment    Discharge Stable Wed Mar 24, 2021  4:36 AM Kelsey Brown discharge to home/self care              Follow-up Information     Follow up With Specialties Details Why Yaritza Núñez MD Cardiology, Cardiology Imaging, Multidisciplinary Follow up in 3 week(s)  [facility] [place] [address]  [phone]            Discharge Medication List as of 3/24/2021 11:26 AM      START taking these medications    Details   aluminum-magnesium hydroxide-simethicone (MYLANTA) 200-200-20 mg/5 mL suspension Take 30 mL by mouth every 6 (six) hours as needed for indigestion or heartburn for up to 14 days, Starting Wed 3/24/2021, Until Wed 4/7/2021, Normal      famotidine (PEPCID) 20 mg tablet Take 1 tablet (20 mg total) by mouth daily for 14 days, Starting Wed 3/24/2021, Until Wed 4/7/2021, Normal      potassium chloride (KLOR-CON) 20 mEq packet Take 20 mEq by mouth daily, Starting Wed 3/24/2021, Until Sun 5/23/2021, Normal         CONTINUE these medications which have CHANGED    Details   furosemide (Lasix) 20 mg tablet Take 2 tablets (40 mg total) by mouth daily, Starting Wed 3/24/2021, Print         CONTINUE these medications which have NOT CHANGED    Details   aspirin (ECOTRIN LOW STRENGTH) 81 mg EC tablet Take 1 tablet (81 mg total) by mouth daily, Starting u 10/15/2020, Normal      atorvastatin (LIPITOR) 10 mg tablet Take 1 tablet (10 mg total) by mouth daily with dinner, Starting Wed 10/14/2020, Normal      clopidogrel (PLAVIX) 75 mg tablet Take 1 tablet (75 mg total) by mouth daily, Starting Thu 10/15/2020, Normal      lisinopril (ZESTRIL) 10 mg tablet Take 1 tablet (10 mg total) by mouth daily, Starting Thu 10/15/2020, Normal      metoprolol tartrate (LOPRESSOR) 50 mg tablet Take 1 tablet (50 mg total) by mouth every 12 (twelve) hours, Starting Wed 10/14/2020, Normal           Outpatient Discharge Orders   Basic metabolic panel   Standing Status: Future Standing Exp   Date: 03/24/22       PDMP Review     None          ED Provider  Electronically Signed by           Carole Ta MD  03/26/21 0867

## 2021-03-24 NOTE — NURSING NOTE
Patient refuses tele monitor, ekg, troponin  Pt educated  Dr Anne Marie Ramires and residence made aware

## 2021-03-24 NOTE — H&P
Dee U  64   H&P- Odalys Weldon 1975, 48 y o  male MRN: [id number]  Unit/Bed#: -01 Encounter: [id number]  Primary Care Provider: No primary care provider on file  Date and time admitted to hospital: 3/24/2021  1:48 AM    * Abdominal pain  Assessment & Plan  Patient presenting with epigastric abdominal pain radiated to right upper quadrant associated with acid reflux and exacerbated by food intake  Patient takes aspirin on a daily basis, denies alcohol consumption  No blood in the stools or discoloration  Hemoglobin dropped from 15 to 13 since last admission  Total bilirubin noted to be mildly elevated  Ultrasound showed no evidence of cholelithiasis or cholecystitis  Hepatic and right renal cysts  Start on pantoprazole 40 mg IV b i d  Consult Gastroenterology for further recommendations/possible endoscopy         DESHAWN (acute kidney injury) ([facility] [address] )  Assessment & Plan  BUN 6 - 20 mg/dL 14    Creatinine 0 50 - 1 20 mg/dL 1  52High       Baseline around 1 1-1 2  Avoid nephrotoxic drugs, hypotension  We will hold Lasix and lisinopril for now  Continue gentle hydration with Plasma-Lyte 50 cc/hour for 10 hours, monitor for fluid overload  Urinary retention protocol, bladder scan now  Daily weights, strict I&O  BNP in am     CHF (congestive heart failure) ([facility])  Assessment & Plan  Wt Readings from Last 3 Encounters:   03/24/21 116 kg (255 lb 4 7 oz)   10/13/20 104 kg (230 lb)     Volume status appears stable with only mild lower extremity edema  Patient denies any chest pain, shortness of breath, palpitations  BNP is pending  Echo 10/2020 showed The ventricle was markedly dilated  (7 1cm)Systolic function was severely reduced  Ejection fraction was estimated to be 20 %  There was severe diffuse hypokinesis    Has single chamber AICD, recommended device interrogation  Cardiology was consulted for further recommendations  Continue metoprolol 50 mg b i d , Plavix, aspirin, Lasix and lisinopril on hold due to DESHAWN              Elevated troponin I level  Assessment & Plan  Initial troponin elevated, has been hospitalized previously with similar complaint  Trend troponin x3  Consult cardiology, appreciate recommendations    Mixed hyperlipidemia  Assessment & Plan  Continue atorvastatin 10 mg daily    Essential (primary) hypertension  Assessment & Plan  Patient was given clonidine 0 2 mg once in the ED  Continue metoprolol 50 mg b i d  With holding parameters  Lisinopril on hold due to DESHAWN, consider adding amlodipine for BP control      VTE Prophylaxis: Heparin  / sequential compression device   Code Status:  Full code  POLST: There is no POLST form on file for this patient (pre-hospital)      Anticipated Length of Stay:  Patient will be admitted on an Inpatient basis with an anticipated length of stay of  more than 2 midnights  Justification for Hospital Stay:  Elevated troponin, abdominal pain    Total Time for Visit, including Counseling / Coordination of Care: 45 minutes  Greater than 50% of this total time spent on direct patient counseling and coordination of care  Chief Complaint:   Abdominal pain    History of Present Illness:    Vega Conrad is a 48 y o  male who presents with abdominal pain for 1 day  Patient describes it as discomfort localized in the epigastric area with radiation to the right upper quadrant, associated with acid reflux and exacerbated by food intake  Patient denies any alcohol consumption, he is on aspirin on a daily basis  Patient denies any nausea or vomiting, no diarrhea, no constipation, no blood in the stool, no dark stools  Patient has a history of HFrEF, hypertension, hyperlipidemia  Patient has an ICD  In the ED patient was initially hypertensive, he received 1 dose of clonidine which improved his BP  Blood work show elevated troponin, elevated creatinine, hemoglobin drop from last admission    Ultrasound negative for cholelithiasis or cholecystitis, however patient has hepatic and right renal cysts  During my evaluation patient denies any chest pain, shortness of breath, palpitations  Patient states his only problem is the abdominal pain  BP is stable now, he is saturating well on room air  Review of Systems:    Review of Systems   Constitutional: Positive for appetite change  Negative for fever  HENT: Negative for trouble swallowing  Eyes: Negative for visual disturbance  Respiratory: Negative for shortness of breath  Cardiovascular: Positive for leg swelling  Negative for chest pain and palpitations  Gastrointestinal: Positive for abdominal pain  Negative for anal bleeding, blood in stool, constipation, diarrhea, nausea and vomiting  Genitourinary: Negative for difficulty urinating  Musculoskeletal: Negative for back pain  Neurological: Negative for facial asymmetry  Psychiatric/Behavioral: Positive for dysphoric mood  Past Medical and Surgical History:     Past Medical History:   Diagnosis Date    [facility]     Hypertension     Pacemaker        History reviewed  No pertinent surgical history  Meds/Allergies:    Prior to Admission medications    Medication Sig Start Date End Date Taking?  Authorizing Provider   aspirin (ECOTRIN LOW STRENGTH) 81 mg EC tablet Take 1 tablet (81 mg total) by mouth daily 10/15/20   Deon Coley MD   atorvastatin (LIPITOR) 10 mg tablet Take 1 tablet (10 mg total) by mouth daily with dinner 10/14/20   Deon Coley MD   clopidogrel (PLAVIX) 75 mg tablet Take 1 tablet (75 mg total) by mouth daily 10/15/20   Deon Coley MD   furosemide (Lasix) 20 mg tablet Take 1 tablet (20 mg total) by mouth daily  Patient not taking: Reported on 3/24/2021 10/15/20   Deon Coley MD   lisinopril (ZESTRIL) 10 mg tablet Take 1 tablet (10 mg total) by mouth daily 10/15/20   Deon Coley MD   metoprolol tartrate (LOPRESSOR) 50 mg tablet Take 1 tablet (50 mg total) by mouth every 12 (twelve) hours 10/14/20   Carolyn Marvin MD     I have reviewed home medications with a medical source (PCP, Pharmacy, other)  Allergies: No Known Allergies    Social History:     Marital Status: Single     Substance Use History:   Social History     Substance and Sexual Activity   Alcohol Use Not Currently     Social History     Tobacco Use   Smoking Status Never Smoker   Smokeless Tobacco Never Used     Social History     Substance and Sexual Activity   Drug Use Yes    Types: Marijuana       Family History:    History reviewed  No pertinent family history  Physical Exam:     Vitals:   Blood Pressure: 122/98(right arm/manual) (03/24/21 0441)  Pulse: 100 (03/24/21 0441)  Temperature: 98 1 °F (36 7 °C) (03/24/21 0151)  Temp Source: Oral (03/24/21 0151)  Respirations: 22 (03/24/21 0441)  Weight - Scale: 116 kg (255 lb 4 7 oz) (03/24/21 0153)  SpO2: 100 % (03/24/21 0441)    Physical Exam  Constitutional:       Appearance: He is obese  HENT:      Head: Normocephalic and atraumatic  Eyes:      General: No scleral icterus  Right eye: No discharge  Left eye: No discharge  Conjunctiva/sclera: Conjunctivae normal    Cardiovascular:      Rate and Rhythm: Regular rhythm  Tachycardia present  Pulses: Normal pulses  Heart sounds: No murmur  Pulmonary:      Effort: Pulmonary effort is normal  No respiratory distress  Breath sounds: Normal breath sounds  No wheezing or rales  Abdominal:      General: Bowel sounds are normal  There is no distension  Palpations: Abdomen is soft  Tenderness: There is abdominal tenderness  There is no guarding or rebound  Musculoskeletal:      Right lower leg: Edema present  Left lower leg: Edema present  Skin:     General: Skin is warm  Neurological:      General: No focal deficit present  Mental Status: He is alert and oriented to person, place, and time             Additional Data:     Lab Results: I have personally reviewed pertinent reports  Results from last 7 days   Lab Units 03/24/21  0208   WBC Thousand/uL 5 05   HEMOGLOBIN g/dL 13 7   HEMATOCRIT % 42 4   PLATELETS Thousands/uL 189   NEUTROS PCT % 55   LYMPHS PCT % 27   MONOS PCT % 17*   EOS PCT % 0     Results from last 7 days   Lab Units 03/24/21  0208   SODIUM mmol/L 140   POTASSIUM mmol/L 3 5   CHLORIDE mmol/L 103   CO2 mmol/L 29   BUN mg/dL 14   CREATININE mg/dL 1 52*   ANION GAP mmol/L 8   CALCIUM mg/dL 8 6   ALBUMIN g/dL 3 0*   TOTAL BILIRUBIN mg/dL 1 27*   ALK PHOS U/L 53 1   ALT U/L 30   AST U/L 25   GLUCOSE RANDOM mg/dL 135                 Results from last 7 days   Lab Units 03/24/21  0215   LACTIC ACID mmol/L 1 7       Imaging: I have personally reviewed pertinent reports  US right upper quadrant   Final Result by Scarlet Hernandez MD (03/24 0416)      1  No evidence of cholelithiasis or cholecystitis  2   Hepatic and right renal cysts  Workstation performed: [id number]         XR chest 1 view portable    (Results Pending)       EKG, Pathology, and Other Studies Reviewed on Admission:   · EKG:  Sinus tachycardia, PVCs, left parotid enlargement, left anterior fascicular block, abnormal R-wave progression, QTC prolonged    Allscripts / Epic Records Reviewed: Yes     ** Please Note: This note has been constructed using a voice recognition system   **

## 2021-03-24 NOTE — ED NOTES
Ultrasound on-call reached  Pt to have US of gall bladder  Pt has history of sleep apnea-had a C-Pap machine but does not utilize it bc states it is uncomfortable        Carl Urban, ARACELIS  03/24/21 9125

## 2021-03-24 NOTE — CONSULTS
Consultation - Cardiology   [facility] 48 y o  male MRN: [id number]  Unit/Bed#: -01 Encounter: [id number]    Assessment:  Principal Problem:    Abdominal pain  Active Problems:    CHF (congestive heart failure) ([facility] [address] )    Essential (primary) hypertension    Mixed hyperlipidemia    Elevated troponin I level    [facility]) [facility]    Non MI elevation in troponin, His symptoms do sound GI in nature, not cardiac  He was likely dehydrated on admission, in the setting of recent PO intake while still on his diuretic  He appears to have stable volume status currently  Suspect this cardiomyopathy is nonischemic based on the reported results of the prior cardiac cath (distal OM disease), would not explain the degree of LV dysfunction he has  AICD is in place  NSVT noted on tele from ER (currently doesn't want to be on tele), likely in setting of some hypokalemia (being repleted)  Plan:  Recommend continuing his stable heart failure therapy of lisinopril, metoprolol  If his creatinine is normalized, would continue his usual home PO lasix dose, as he was able to eat without a problem today  Needs to establish with cardiology, He is contemplating, but can follow with us in the [facility] office  Will need to establish with device clinic as well, he is not sure of the company but we can get this info in the future  Non MI elevation in troponin, no need for further workup of this, stop trending as decreasing  OK for any procedures needed from a GI standpoint, if any  Also stable for discharge from cardiac standpoint if otherwise no additional evaluation planned  History of Present Illness   Physician Requesting Consult: Vlad Finley MD  Reason for Consult / Principal Problem: Elevated troponin, CHF  HPI: [facility] is a 48y o  year old male who presents with abdominal pain  Patient has a history of systolic congestive heart failure with an ejection fraction of 20%    This was discovered in  when the patient tells me he had an MI  This was at [facility] when he was living up there  He tells me that he did not have any PCI performed  And note from his prior cardiologist from 2019 indicated that he had distal OM disease  He has subsequently undergone ICD implantation  Single lead device  Never fired  He was living in [place] for a bit, relocated to the area just a few months ago  Has not yet established with physicians  He comes to the hospital because of abdominal pain  He had been nauseous  Not vomiting, but has not eaten well the few days prior to admission  Here, he is found to have acute kidney injury and minimally elevated troponin which the 2nd value is now downtrending  He has not complained of any cardiac symptoms  These are different from what presented him to the hospital with his prior MI  The patient has refused some telemetry, repeat EKGs  He says that this is typical for him that he will come to the hospital for a separate issue and because of his significant cardiac history, he is admitted and he feels this is not related to his heart  His abdominal symptoms are typically worsened with food, however he was able to eat well this morning without any symptoms  Tells me he is pretty regular with his medications  He will occasionally miss some doses, but usually has been stable with them  No recent changes  Denies any history of alcohol, drug use, stimulants, etc to me  Family history as noted below is very strong for heart disease, 2 sisters who , "heart attack"    Inpatient consult to Cardiology  Consult performed by: Shelia Parham MD  Consult ordered by: Chintan Osei MD          Review of Systems:  Positive for abdominal pain  Sleep apnea says he had difficulty with the CPAP mask in the past   Otherwise, 12 point ROS negative in detail      Historical Information   Past Medical History:   Diagnosis Date    [facility]congestive heart failure) ([facility] [address] [facility]     Hypertension     Pacemaker      History reviewed  No pertinent surgical history  Social History     Substance and Sexual Activity   Alcohol Use Not Currently     Social History     Substance and Sexual Activity   Drug Use Yes    Types: Marijuana     Social History     Tobacco Use   Smoking Status Never Smoker   Smokeless Tobacco Never Used     Family History: strong family history of heart disease   2 sisters who  of cardiac causes ("heart attack"), mother as well,  in her 76s heart condition    Meds/Allergies     Current Facility-Administered Medications:     acetaminophen (TYLENOL) tablet 650 mg, 650 mg, Oral, Q4H PRN, Alan Jorge MD    aluminum-magnesium hydroxide-simethicone (MYLANTA) oral suspension 30 mL, 30 mL, Oral, Q6H PRN, Alan Jorge MD    aspirin (ECOTRIN LOW STRENGTH) EC tablet 81 mg, 81 mg, Oral, Daily, Nettie Talley MD    atorvastatin (LIPITOR) tablet 10 mg, 10 mg, Oral, Daily With Elliot Rdz MD    bisacodyl (DULCOLAX) rectal suppository 10 mg, 10 mg, Rectal, Daily PRN, Alan Jorge MD    clopidogrel (PLAVIX) tablet 75 mg, 75 mg, Oral, Daily, Nettie Talley MD    heparin (porcine) subcutaneous injection 5,000 Units, 5,000 Units, Subcutaneous, Q8H [facility], Alan Jorge MD, 5,000 Units at 21 0609    HYDROmorphone (DILAUDID) injection 0 5 mg, 0 5 mg, Intravenous, Q1H PRN, Alan Jorge MD    metoprolol tartrate (LOPRESSOR) tablet 50 mg, 50 mg, Oral, Q12H [facility], Nettie Talley MD    multi-electrolyte (PLASMALYTE-A/ISOLYTE-S PH 7 4) IV solution, 50 mL/hr, Intravenous, Continuous, Nettie Talley MD, Last Rate: 50 mL/hr at 21 0559, 50 mL/hr at 21 0559    nitroglycerin (NITROSTAT) SL tablet 0 4 mg, 0 4 mg, Sublingual, Q5 Min PRN, Alan Jorge MD    oxyCODONE (ROXICODONE) immediate release tablet 10 mg, 10 mg, Oral, Q4H PRN, Alan Jorge, MD    oxyCODONE (ROXICODONE) IR tablet 5 mg, 5 mg, Oral, Q4H PRN, Ty Gosselin, MD    pantoprazole (PROTONIX) injection 40 mg, 40 mg, Intravenous, Q12H [facility], Ty Gosselin, MD, 40 mg at 03/24/21 0605    potassium chloride (K-DUR,KLOR-CON) CR tablet 40 mEq, 40 mEq, Oral, Once, Patricia Zazueta MD    senna (SENOKOT) tablet 8 6 mg, 1 tablet, Oral, HS PRN, Ty Gosselin, MD    simethicone (MYLICON) chewable tablet 80 mg, 80 mg, Oral, 4x Daily PRN, Nettie Talley MD  No Known Allergies    Objective   Vitals: Blood pressure 102/55, pulse 88, temperature (!) 96 3 °F (35 7 °C), temperature source Tympanic, resp  rate 18, height 5' 10" (1 778 m), weight 115 kg (253 lb 8 5 oz), SpO2 98 %  , Body mass index is 36 38 kg/m² ,   Orthostatic Blood Pressures      Most Recent Value   Blood Pressure  102/55 filed at 03/24/2021 9755   Patient Position - Orthostatic VS  Lying filed at 03/24/2021 0735          No intake or output data in the 24 hours ending 03/24/21 0901    Invasive Devices     Peripheral Intravenous Line            Peripheral IV 03/24/21 Right Antecubital less than 1 day              Physical Exam:  GEN: [employer] appears well, alert and oriented x 3, pleasant and cooperative   HEENT: pupils equal, round, and reactive to light; extraocular muscles intact  NECK: supple, no carotid bruits   HEART: regular rhythm, normal S1 and S2, no murmurs, clicks, gallops or rubs   LUNGS: clear to auscultation bilaterally; no wheezes, rales, or rhonchi   ABDOMEN: normal bowel sounds, soft, no tenderness, no distention  EXTREMITIES: peripheral pulses normal; no clubbing, cyanosis, or edema  NEURO: no focal findings   SKIN: normal without suspicious lesions on exposed skin    Lab Results:     Results from last 7 days   Lab Units 03/24/21  0658 03/24/21  0208   TROPONIN I ng/mL 0 12* 0 13*     Results from last 7 days   Lab Units 03/24/21  0208   WBC Thousand/uL 5 05   HEMOGLOBIN g/dL 13 7   HEMATOCRIT % 42 4 PLATELETS Thousands/uL 189     Results from last 7 days   Lab Units 03/24/21  0208   TRIGLYCERIDES mg/dL 67 7   HDL mg/dL 27*     Results from last 7 days   Lab Units 03/24/21  0208   POTASSIUM mmol/L 3 5   CHLORIDE mmol/L 103   CO2 mmol/L 29   BUN mg/dL 14   CREATININE mg/dL 1 52*   CALCIUM mg/dL 8 6   ALK PHOS U/L 53 1   ALT U/L 30   AST U/L 25         Results from last 7 days   Lab Units 03/24/21  0208   MAGNESIUM mg/dL 1 9       Imaging: I have personally reviewed pertinent films in PACS  Xr Chest 1 View Portable    Result Date: 3/24/2021  Narrative: CHEST INDICATION:   hypoxia  COMPARISON:  October 14, 2020 EXAM PERFORMED/VIEWS:  XR CHEST PORTABLE FINDINGS: The heart is mildly enlarged  There is a left-sided AICD with the lead tip appears to be in appropriate position  Prominence of the eliud is similar from previous examination and probably represents prominent pulmonary arteries  Subtle relative increased density is noted in the lung bases  The left hemidiaphragm is not well seen  No pneumothorax  Osseous structures appear within normal limits for patient age  Impression: Probable small left pleural effusion  Relative increased parenchymal density at the lung bases could represent atelectasis or developing pneumonia  Mild cardiomegaly  Workstation performed: [id number]     Us Right Upper Quadrant    Result Date: 3/24/2021  Narrative: RIGHT UPPER QUADRANT ULTRASOUND INDICATION:     Right upper quadrant pain  Unable to do CT  COMPARISON:  None TECHNIQUE:   Real-time ultrasound of the right upper quadrant was performed with a curvilinear transducer with both volumetric sweeps and still imaging techniques  FINDINGS: PANCREAS:  Visualized portions of the pancreas are within normal limits  AORTA AND IVC:  Visualized portions are normal for patient age  LIVER: Size:  Within normal range  The liver measures 15 2 cm in the midclavicular line  Contour:  Surface contour is smooth   Parenchyma:  Echogenicity and echotexture are within normal limits  Lobulated hypoechoic lesion in the left lobe liver measures 1 7 x 1 2 x 1 6 cm and likely represents a cyst   There is an additional subcapsular 1 cm cyst in the left lobe  There is a 1 5 x 1 7 x 1 5 cm cyst in the right lobe of liver  Limited imaging of the main portal vein shows it to be patent and hepatopetal   BILIARY: The gallbladder is normal in caliber  No wall thickening or pericholecystic fluid  No stones or sludge identified  No sonographic Lang's sign  No intrahepatic biliary dilatation  CBD measures 3 mm  No proximal choledocholithiasis  KIDNEY: Right kidney measures 11 1 x 4 7 x 5 5 cm  1 2 x 0 8 x 0 9 cm cyst in the midpole of the right kidney  ASCITES:   None  Impression: 1  No evidence of cholelithiasis or cholecystitis  2   Hepatic and right renal cysts  Workstation performed: [id number]       EKG:   Sinus tachycardia, PACs, PVCs  Nonspecific ST-T wave abnormalities  Telemetry: not on    Echo 10/14/2020:  LEFT VENTRICLE:  The ventricle was markedly dilated  (8 9AQ)  Systolic function was severely reduced  Ejection fraction was estimated to be 20 %  There was severe diffuse hypokinesis  Wall thickness was normal   The changes were consistent with eccentric hypertrophy  There was fusion of early and atrial contributions to ventricular filling      LEFT ATRIUM:  The atrium was moderately dilated      MITRAL VALVE:  There was mild regurgitation      AORTIC VALVE:  Doppler cardiac output was 4 17 L/min, using LVOT flow data  Doppler cardiac index was 1 88 L/min/mï¾², using LVOT flow data

## 2021-03-24 NOTE — PLAN OF CARE
Problem: PAIN - ADULT  Goal: Verbalizes/displays adequate comfort level or baseline comfort level  Description: Interventions:  - Encourage patient to monitor pain and request assistance  - Assess pain using appropriate pain scale  - Administer analgesics based on type and severity of pain and evaluate response  - Implement non-pharmacological measures as appropriate and evaluate response  - Consider cultural and social influences on pain and pain management  - Notify physician/advanced practitioner if interventions unsuccessful or patient reports new pain  Outcome: Progressing     Problem: SAFETY ADULT  Goal: Patient will remain free of falls  Description: INTERVENTIONS:  - Assess patient frequently for physical needs  -  Identify cognitive and physical deficits and behaviors that affect risk of falls    -  [place] fall precautions as indicated by assessment   - Educate patient/family on patient safety including physical limitations  - Instruct patient to call for assistance with activity based on assessment  - Modify environment to reduce risk of injury  - Consider OT/PT consult to assist with strengthening/mobility  Outcome: Progressing  Goal: Maintain or return to baseline ADL function  Description: INTERVENTIONS:  -  Assess patient's ability to carry out ADLs; assess patient's baseline for ADL function and identify physical deficits which impact ability to perform ADLs (bathing, care of mouth/teeth, toileting, grooming, dressing, etc )  - Assess/evaluate cause of self-care deficits   - Assess range of motion  - Assess patient's mobility; develop plan if impaired  - Assess patient's need for assistive devices and provide as appropriate  - Encourage maximum independence but intervene and supervise when necessary  - Involve family in performance of ADLs  - Assess for home care needs following discharge   - Consider OT consult to assist with ADL evaluation and planning for discharge  - Provide patient education as appropriate  Outcome: Progressing  Goal: Maintain or return mobility status to optimal level  Description: INTERVENTIONS:  - Assess patient's baseline mobility status (ambulation, transfers, stairs, etc )    - Identify cognitive and physical deficits and behaviors that affect mobility  - Identify mobility aids required to assist with transfers and/or ambulation (gait belt, sit-to-stand, lift, walker, cane, etc )  - [place] fall precautions as indicated by assessment  - Record patient progress and toleration of activity level on Mobility SBAR; progress patient to next Phase/Stage  - Instruct patient to call for assistance with activity based on assessment  - Consider rehabilitation consult to assist with strengthening/weightbearing, etc   Outcome: Progressing     Problem: Nutrition/Hydration-ADULT  Goal: Nutrient/Hydration intake appropriate for improving, restoring or maintaining nutritional needs  Description: Monitor and assess patient's nutrition/hydration status for malnutrition  Collaborate with interdisciplinary team and initiate plan and interventions as ordered  Monitor patient's weight and dietary intake as ordered or per policy  Utilize nutrition screening tool and intervene as necessary  Determine patient's food preferences and provide high-protein, high-caloric foods as appropriate       INTERVENTIONS:  - Monitor oral intake, urinary output, labs, and treatment plans  - Assess nutrition and hydration status and recommend course of action  - Evaluate amount of meals eaten  - Assist patient with eating if necessary   - Allow adequate time for meals  - Recommend/ encourage appropriate diets, oral nutritional supplements, and vitamin/mineral supplements  - Order, calculate, and assess calorie counts as needed  - Recommend, monitor, and adjust tube feedings and TPN/PPN based on assessed needs  - Assess need for intravenous fluids  - Provide specific nutrition/hydration education as appropriate  - Include patient/family/caregiver in decisions related to nutrition  Outcome: Progressing     Problem: GASTROINTESTINAL - ADULT  Goal: Minimal or absence of nausea and/or vomiting  Description: INTERVENTIONS:  - Administer IV fluids if ordered to ensure adequate hydration  - Maintain NPO status until nausea and vomiting are resolved  - Nasogastric tube if ordered  - Administer ordered antiemetic medications as needed  - Provide nonpharmacologic comfort measures as appropriate  - Advance diet as tolerated, if ordered  - Consider nutrition services referral to assist patient with adequate nutrition and appropriate food choices  Outcome: Progressing

## 2021-03-24 NOTE — ED NOTES
Pt aware we need urine sample, unable to give sample at this moment        Bossman Watkins  03/24/21 4113

## 2021-03-24 NOTE — DISCHARGE SUMMARY
INTERNAL MEDICINE RESIDENCY DISCHARGE SUMMARY     Aman Moss   48 y o  male  MRN: [id number]  Room/Bed: /-01     Dee U  64  EA 3RD FLOOR MED SURG   Encounter: [id number]    Active Problems:    CHF (congestive heart failure) ([facility] [address] )    Essential (primary) hypertension    Mixed hyperlipidemia    Elevated troponin I level    [facility]HA[facility] ([facility] )      No new Assessment & Plan notes have been filed under this hospital service since the last note was generated  Service: Internal Medicine      [address] COURSE     48 y o  male with a PMH of  history of HFrEF with EF 20%, hypertension, hyperlipidemia  Patient has an ICD  who presents to [facility] ED on 03/24/2021 with abdominal pain for 1 day  Patient described abdominal pain   as discomfort localized in the epigastric area with radiation to the right upper quadrant, associated with heartburn, nausea,  exacerbated by food intake and slight reviewed relieved by antacid  Associated nausea  Patient denied any alcohol consumption, he is on aspirin on a daily basis  Patient denied any  vomiting, diarrhea, constipation, blood in the stool, dark stools  denied chest pain, shortness of breath, cough, orthopnea, PND, diaphoresis, palpitations, dizziness or lightheadedness  In the ED patient was initially hypertensive, he received 1 dose of clonidine which improved his BP  Blood work show elevated troponin at 0 13 , elevated creatinine at 1 5, abdominal  ultrasound negative for cholelithiasis or cholecystitis, however patient has hepatic and right renal cysts  EKG shows sinus tachycardia with PVCs  No acute ST changes  During initial evaluation patient denies any chest pain, shortness of breath, palpitations  Patient states his only problem is the abdominal pain  BP is stable, he is saturating well on room air  He was admitted and started on PPI  He was also given some IV fluid  Lasix and lisinopril does recall for the day  Troponin was trended  Troponin drawn trended to 0 12  Serial EKG did not show any acute changes   Patient declined further troponin   EKGs or telemetry  Patient's abdominal pain improved/subside with treatment  Cardiology was consulted during admission  Patient states he recently moved from [place]  He has an extra beat with a cardiologist   History of cardiac catheterization in 2019 which showed a distal OM disease he did not get the PCI  He has a undergone ICD implantation single lead device had never fired  Cardiology recommended resumption of home medications went creatinine normalized  Patient was stable for discharge  Troponin elevation was likely due to known MI elevation  No further workup for it as it was downtrending  Patient is clinically cleared for discharge  He is to follow-up with his PCP, Cardiology and repeat BMP as outpatient  On Examination:  Alert and oriented in time person and place  Chest- Clinically clear on auscultation, no rales  CVS- HS1 and 2  No murmurs appreciated  Mild pedal edema b/l  Abdomen: Mild epigastric tenderness  Normoactive BS  No organomegaly  CNS- no focal neurological deficit  DISCHARGE INFORMATION     PCP at Discharge: See PCP    Admitting Provider: Jimi Rizzo MD  Admission Date: 3/24/2021    Discharge Provider: Jimi Rizzo MD  Discharge Date: 3/24/2021    Discharge Disposition: Home/Self Care  Discharge Condition: good  Discharge with Lines: No    Discharge Diet: cardiac diet  Activity Restrictions: none  Test Results Pending at Discharge: 0    Discharge Diagnoses:  Principal Problem (Resolved):     Abdominal pain  Active Problems:    CHF (congestive heart failure) ([facility])    Essential (primary) hypertension    Mixed hyperlipidemia    Elevated troponin I level    DESHAWN (acute kidney injury) ([facility] [address] )      Consulting Providers:      Diagnostic & Therapeutic Procedures Performed:  Xr Chest 1 View Portable    Result Date: 3/24/2021  Impression: Probable small left pleural effusion  Relative increased parenchymal density at the lung bases could represent atelectasis or developing pneumonia  Mild cardiomegaly  Workstation performed: [id number]     Us Right Upper Quadrant    Result Date: 3/24/2021  Impression: 1  No evidence of cholelithiasis or cholecystitis  2   Hepatic and right renal cysts   Workstation performed: [id number]       Code Status: Level 1 - Full Code  Advance Directive & Living Will: <no information>  Power of :    POLST:      Medications:  Current Discharge Medication List        Current Discharge Medication List      START taking these medications    Details   aluminum-magnesium hydroxide-simethicone (MYLANTA) 200-200-20 mg/5 mL suspension Take 30 mL by mouth every 6 (six) hours as needed for indigestion or heartburn for up to 14 days  Qty: 355 mL, Refills: 0    Associated Diagnoses: Abdominal pain      famotidine (PEPCID) 20 mg tablet Take 1 tablet (20 mg total) by mouth daily for 14 days  Qty: 14 tablet, Refills: 0    Associated Diagnoses: Abdominal pain      potassium chloride (KLOR-CON) 20 mEq packet Take 20 mEq by mouth daily  Qty: 30 packet, Refills: 1    Associated Diagnoses: CHF (congestive heart failure) ([facility] [address] )           Current Discharge Medication List      CONTINUE these medications which have NOT CHANGED    Details   aspirin (ECOTRIN LOW STRENGTH) 81 mg EC tablet Take 1 tablet (81 mg total) by mouth daily  Qty: 30 tablet, Refills: 1    Associated Diagnoses: Atherosclerotic heart disease of native coronary artery without angina pectoris      atorvastatin (LIPITOR) 10 mg tablet Take 1 tablet (10 mg total) by mouth daily with dinner  Qty: 30 tablet, Refills: 0    Associated Diagnoses: Atherosclerotic heart disease of native coronary artery without angina pectoris      clopidogrel (PLAVIX) 75 mg tablet Take 1 tablet (75 mg total) by mouth daily  Qty: 30 tablet, Refills: 0    Associated Diagnoses: Atherosclerotic heart disease of native coronary artery without angina pectoris      lisinopril (ZESTRIL) 10 mg tablet Take 1 tablet (10 mg total) by mouth daily  Qty: 30 tablet, Refills: 0    Associated Diagnoses: Atherosclerotic heart disease of native coronary artery without angina pectoris      metoprolol tartrate (LOPRESSOR) 50 mg tablet Take 1 tablet (50 mg total) by mouth every 12 (twelve) hours  Qty: 60 tablet, Refills: 0    Associated Diagnoses: Atherosclerotic heart disease of native coronary artery without angina pectoris             Allergies:  No Known Allergies    FOLLOW-UP     PCP Outpatient Follow-up:  Follow PCP    Consulting Providers Follow-up:  Cardiology     Active Issues Requiring Follow-up:   0    Discharge Statement:   I spent 30 minutes minutes discharging the patient  This time was spent on the day of discharge  I had direct contact with the patient on the day of discharge  Additional documentation is required if more than 30 minutes were spent on discharge  Portions of the record may have been created with voice recognition software  Occasional wrong word or "sound a like" substitutions may have occurred due to the inherent limitations of voice recognition software    Read the chart carefully and recognize, using context, where substitutions have occurred     ==  Pretty Garcia MD  [address]  Internal Medicine Resident PGY-2

## 2021-03-24 NOTE — DISCHARGE INSTRUCTIONS
Acute Kidney Injury, Ambulatory Care   GENERAL INFORMATION:   Acute kidney injury  happens when your kidneys suddenly stop working correctly  Normally, the kidneys turn fluid, chemicals, and waste from your blood into urine  In acute kidney injury, your kidneys can no longer do this  In most cases, it is temporary, but it may become a chronic kidney condition  Common symptoms include the following:   · Decreased urination or dark-colored urine    · Swelling in your arms, legs, or feet     · Abdominal or low back pain    · Vomiting, diarrhea, or loss of appetite    · Fatigue     · Skin rash  Seek immediate care for the following symptoms:   · Heart beating faster than normal for you    · Sudden chest pain or trouble breathing    · Seizure  Treatment for acute kidney injury:  Treatment depends upon the cause of your acute kidney injury and how severe it is  Medicines may be given to increase blood flow to your kidneys and protect your kidneys  You may also need medicine to decrease inflammation in your kidneys  You may be given IV fluids to replenish fluids and help your heart pump blood  Dialysis may be needed to remove chemicals and waste from your blood when your kidneys cannot  Manage acute kidney injury:   · Manage other health conditions  Care for your diabetes, high blood pressure, or heart disease  These conditions increase your risk for acute kidney injury  · Talk to your healthcare provider before you take over-the-counter-medicine  NSAIDs, stomach medicine, or laxatives may harm your kidneys and increase your risk for acute kidney injury  Follow up with your healthcare provider as directed:  Write down your questions so you remember to ask them during your visits  CARE AGREEMENT:   You have the right to help plan your care  Learn about your health condition and how it may be treated  Discuss treatment options with your caregivers to decide what care you want to receive   You always have the right to refuse treatment  The above information is an  only  It is not intended as medical advice for individual conditions or treatments  Talk to your doctor, nurse or pharmacist before following any medical regimen to see if it is safe and effective for you  © 2014 [address] is for End User's use only and may not be sold, redistributed or otherwise used for commercial purposes  All illustrations and images included in CareNotes® are the copyrighted property of A [employer] M [employer]  or Norm Houser

## 2021-03-26 ENCOUNTER — HOSPITAL ENCOUNTER (INPATIENT)
Facility: HOSPITAL | Age: 46
LOS: 3 days | Discharge: HOME | DRG: 469 | End: 2021-03-31
Attending: EMERGENCY MEDICINE | Admitting: INTERNAL MEDICINE
Payer: COMMERCIAL

## 2021-03-26 ENCOUNTER — APPOINTMENT (EMERGENCY)
Dept: RADIOLOGY | Facility: HOSPITAL | Age: 46
DRG: 469 | End: 2021-03-26
Payer: COMMERCIAL

## 2021-03-26 DIAGNOSIS — R77.8 ELEVATED TROPONIN I LEVEL: ICD-10-CM

## 2021-03-26 DIAGNOSIS — N17.9 AKI (ACUTE KIDNEY INJURY) (HCC): ICD-10-CM

## 2021-03-26 DIAGNOSIS — R11.0 NAUSEA: ICD-10-CM

## 2021-03-26 DIAGNOSIS — I50.9 CHF (CONGESTIVE HEART FAILURE) (HCC): ICD-10-CM

## 2021-03-26 DIAGNOSIS — R10.13 EPIGASTRIC PAIN: Primary | ICD-10-CM

## 2021-03-26 DIAGNOSIS — I25.10 ATHEROSCLEROTIC HEART DISEASE OF NATIVE CORONARY ARTERY WITHOUT ANGINA PECTORIS: ICD-10-CM

## 2021-03-26 LAB
ALBUMIN SERPL BCP-MCNC: 2.7 G/DL (ref 3.5–5)
ALP SERPL-CCNC: 66 U/L (ref 46–116)
ALT SERPL W P-5'-P-CCNC: 98 U/L (ref 12–78)
ANION GAP SERPL CALCULATED.3IONS-SCNC: 7 MMOL/L (ref 4–13)
AST SERPL W P-5'-P-CCNC: 90 U/L (ref 5–45)
BASOPHILS # BLD AUTO: 0.02 THOUSANDS/ΜL (ref 0–0.1)
BASOPHILS NFR BLD AUTO: 0 % (ref 0–1)
BILIRUB SERPL-MCNC: 1.31 MG/DL (ref 0.2–1)
BUN SERPL-MCNC: 18 MG/DL (ref 5–25)
CALCIUM ALBUM COR SERPL-MCNC: 9.4 MG/DL (ref 8.3–10.1)
CALCIUM SERPL-MCNC: 8.4 MG/DL (ref 8.3–10.1)
CHLORIDE SERPL-SCNC: 107 MMOL/L (ref 100–108)
CO2 SERPL-SCNC: 23 MMOL/L (ref 21–32)
CREAT SERPL-MCNC: 1.84 MG/DL (ref 0.6–1.3)
EOSINOPHIL # BLD AUTO: 0.01 THOUSAND/ΜL (ref 0–0.61)
EOSINOPHIL NFR BLD AUTO: 0 % (ref 0–6)
ERYTHROCYTE [DISTWIDTH] IN BLOOD BY AUTOMATED COUNT: 15.1 % (ref 11.6–15.1)
GFR SERPL CREATININE-BSD FRML MDRD: 49 ML/MIN/1.73SQ M
GLUCOSE SERPL-MCNC: 127 MG/DL (ref 65–140)
HCT VFR BLD AUTO: 44.5 % (ref 36.5–49.3)
HGB BLD-MCNC: 14.3 G/DL (ref 12–17)
IMM GRANULOCYTES # BLD AUTO: 0.02 THOUSAND/UL (ref 0–0.2)
IMM GRANULOCYTES NFR BLD AUTO: 0 % (ref 0–2)
LIPASE SERPL-CCNC: 108 U/L (ref 73–393)
LYMPHOCYTES # BLD AUTO: 1.27 THOUSANDS/ΜL (ref 0.6–4.47)
LYMPHOCYTES NFR BLD AUTO: 23 % (ref 14–44)
MCH RBC QN AUTO: 30.9 PG (ref 26.8–34.3)
MCHC RBC AUTO-ENTMCNC: 32.1 G/DL (ref 31.4–37.4)
MCV RBC AUTO: 96 FL (ref 82–98)
MONOCYTES # BLD AUTO: 0.69 THOUSAND/ΜL (ref 0.17–1.22)
MONOCYTES NFR BLD AUTO: 12 % (ref 4–12)
NEUTROPHILS # BLD AUTO: 3.55 THOUSANDS/ΜL (ref 1.85–7.62)
NEUTS SEG NFR BLD AUTO: 65 % (ref 43–75)
NRBC BLD AUTO-RTO: 0 /100 WBCS
PLATELET # BLD AUTO: 176 THOUSANDS/UL (ref 149–390)
PMV BLD AUTO: 10.5 FL (ref 8.9–12.7)
POTASSIUM SERPL-SCNC: 4.4 MMOL/L (ref 3.5–5.3)
PROT SERPL-MCNC: 7.4 G/DL (ref 6.4–8.2)
RBC # BLD AUTO: 4.63 MILLION/UL (ref 3.88–5.62)
SODIUM SERPL-SCNC: 137 MMOL/L (ref 136–145)
TROPONIN I SERPL-MCNC: 0.19 NG/ML
WBC # BLD AUTO: 5.56 THOUSAND/UL (ref 4.31–10.16)

## 2021-03-26 PROCEDURE — 99285 EMERGENCY DEPT VISIT HI MDM: CPT

## 2021-03-26 PROCEDURE — 85025 COMPLETE CBC W/AUTO DIFF WBC: CPT | Performed by: EMERGENCY MEDICINE

## 2021-03-26 PROCEDURE — 93005 ELECTROCARDIOGRAM TRACING: CPT

## 2021-03-26 PROCEDURE — 36415 COLL VENOUS BLD VENIPUNCTURE: CPT | Performed by: EMERGENCY MEDICINE

## 2021-03-26 PROCEDURE — 80053 COMPREHEN METABOLIC PANEL: CPT | Performed by: EMERGENCY MEDICINE

## 2021-03-26 PROCEDURE — 96374 THER/PROPH/DIAG INJ IV PUSH: CPT

## 2021-03-26 PROCEDURE — 71046 X-RAY EXAM CHEST 2 VIEWS: CPT

## 2021-03-26 PROCEDURE — 99284 EMERGENCY DEPT VISIT MOD MDM: CPT | Performed by: EMERGENCY MEDICINE

## 2021-03-26 PROCEDURE — 84484 ASSAY OF TROPONIN QUANT: CPT | Performed by: EMERGENCY MEDICINE

## 2021-03-26 PROCEDURE — 96375 TX/PRO/DX INJ NEW DRUG ADDON: CPT

## 2021-03-26 PROCEDURE — NC001 PR NO CHARGE: Performed by: INTERNAL MEDICINE

## 2021-03-26 PROCEDURE — C9113 INJ PANTOPRAZOLE SODIUM, VIA: HCPCS | Performed by: EMERGENCY MEDICINE

## 2021-03-26 PROCEDURE — 83690 ASSAY OF LIPASE: CPT | Performed by: EMERGENCY MEDICINE

## 2021-03-26 RX ORDER — MAGNESIUM HYDROXIDE/ALUMINUM HYDROXICE/SIMETHICONE 120; 1200; 1200 MG/30ML; MG/30ML; MG/30ML
30 SUSPENSION ORAL EVERY 6 HOURS PRN
Status: DISCONTINUED | OUTPATIENT
Start: 2021-03-26 | End: 2021-03-27

## 2021-03-26 RX ORDER — FAMOTIDINE 20 MG/1
20 TABLET, FILM COATED ORAL DAILY
Status: DISCONTINUED | OUTPATIENT
Start: 2021-03-27 | End: 2021-03-26

## 2021-03-26 RX ORDER — METOPROLOL TARTRATE 50 MG/1
50 TABLET, FILM COATED ORAL EVERY 12 HOURS SCHEDULED
Status: DISCONTINUED | OUTPATIENT
Start: 2021-03-27 | End: 2021-03-31 | Stop reason: HOSPADM

## 2021-03-26 RX ORDER — ATORVASTATIN CALCIUM 10 MG/1
10 TABLET, FILM COATED ORAL
Status: DISCONTINUED | OUTPATIENT
Start: 2021-03-27 | End: 2021-03-31 | Stop reason: HOSPADM

## 2021-03-26 RX ORDER — SUCRALFATE 1 G/1
1 TABLET ORAL ONCE
Status: COMPLETED | OUTPATIENT
Start: 2021-03-26 | End: 2021-03-26

## 2021-03-26 RX ORDER — MAGNESIUM HYDROXIDE/ALUMINUM HYDROXICE/SIMETHICONE 120; 1200; 1200 MG/30ML; MG/30ML; MG/30ML
30 SUSPENSION ORAL ONCE
Status: COMPLETED | OUTPATIENT
Start: 2021-03-26 | End: 2021-03-26

## 2021-03-26 RX ORDER — ASPIRIN 81 MG/1
81 TABLET ORAL DAILY
Status: DISCONTINUED | OUTPATIENT
Start: 2021-03-27 | End: 2021-03-31 | Stop reason: HOSPADM

## 2021-03-26 RX ORDER — CLOPIDOGREL BISULFATE 75 MG/1
75 TABLET ORAL DAILY
Status: DISCONTINUED | OUTPATIENT
Start: 2021-03-27 | End: 2021-03-31 | Stop reason: HOSPADM

## 2021-03-26 RX ORDER — ONDANSETRON 2 MG/ML
4 INJECTION INTRAMUSCULAR; INTRAVENOUS ONCE
Status: COMPLETED | OUTPATIENT
Start: 2021-03-26 | End: 2021-03-26

## 2021-03-26 RX ORDER — LIDOCAINE HYDROCHLORIDE 20 MG/ML
15 SOLUTION OROPHARYNGEAL ONCE
Status: COMPLETED | OUTPATIENT
Start: 2021-03-26 | End: 2021-03-26

## 2021-03-26 RX ORDER — PANTOPRAZOLE SODIUM 40 MG/1
40 INJECTION, POWDER, FOR SOLUTION INTRAVENOUS ONCE
Status: COMPLETED | OUTPATIENT
Start: 2021-03-26 | End: 2021-03-26

## 2021-03-26 RX ADMIN — LIDOCAINE HYDROCHLORIDE 15 ML: 20 SOLUTION ORAL; TOPICAL at 20:56

## 2021-03-26 RX ADMIN — ONDANSETRON 4 MG: 2 INJECTION INTRAMUSCULAR; INTRAVENOUS at 22:11

## 2021-03-26 RX ADMIN — SUCRALFATE 1 G: 1 TABLET ORAL at 20:55

## 2021-03-26 RX ADMIN — ALUMINUM HYDROXIDE, MAGNESIUM HYDROXIDE, AND SIMETHICONE 30 ML: 200; 200; 20 SUSPENSION ORAL at 20:55

## 2021-03-26 RX ADMIN — PANTOPRAZOLE SODIUM 40 MG: 40 INJECTION, POWDER, FOR SOLUTION INTRAVENOUS at 22:54

## 2021-03-26 NOTE — UTILIZATION REVIEW
Admitted Inpatient 3/24/21 at 84 17 85  Discharge Order written 3/24/21 at 1124      Initial Clinical Review    Admission: Date/Time/Statement:   Admission Orders (From admission, onward)     Ordered        03/24/21 0437  Inpatient Admission  Once                Orders Placed This Encounter   Procedures    Inpatient Admission     Standing Status:   Standing     Number of Occurrences:   1     Order Specific Question:   Level of Care     Answer:   Med Surg [16]     Order Specific Question:   Bed Type     Answer:   Clinarmanin [4]     Order Specific Question:   Bed request comments     Answer:   tele     Order Specific Question:   Estimated length of stay     Answer:   More than 2 Midnights     Order Specific Question:   Certification     Answer:   I certify that inpatient services are medically necessary for this patient for a duration of greater than two midnights  See H&P and MD Progress Notes for additional information about the patient's course of treatment  ED Arrival Information     Expected Arrival Acuity Means of Arrival Escorted By Service Admission Type    - 3/24/2021 01:41 Urgent Walk-In Self Hospitalist Urgent    Arrival Complaint    Abdominal Pain     Chief Complaint   Patient presents with    Abdominal Pain     reports mid upper epigastric pain that does not radiate  pt reports hurts when he eats      Assessment/Plan:  48year old male with PMHx HTN, HLD, Cardiac Arrest s/p ICD, CHF, GERD  Presented urgently to [facility] ED on 3/24/21 2nd 1 day history of epigastric pain with radiation to the right upper quadrant abdomen, associated with acid reflux and exacerbated by food intake  In ED -  HR 58  /120  Exam + epigastric + RUQ abdominal tenderness, mild lower extremity edema  Ultrasound negative for cholelithiasis or cholecystitis, however patient has hepatic and right renal cysts   Chest X ray with small left pleural effusion   EKG showed no acute ischemia or infarction    Labs:  CBC wnl K+ 3 5  BUN/ Creat 14/ 1 52  Troponin 0 13  T Bili 1 27  BNP 8,346    ED Tx: IVF NSS< IV Toradol, IV Zofran, clonidine x 1 with improvement in BP, [place]  Admitted Inpatient 3/24/21 at 0437 2nd epigastric + abdominal pain - Plan:  IV pantoprazole 40 mg bid, Cardiology  +   GI Consults    3/24/21 Cardiology Consult: Active Problems:    CHF (congestive heart failure) ([facility])    Essential (primary) hypertension    Mixed hyperlipidemia    Elevated troponin I level    DESHAWN (acute kidney injury) ([facility])     Non MI elevation in troponin, His symptoms do sound GI in nature, not cardiac  He was likely dehydrated on admission, in the setting of recent PO intake while still on his diuretic  He appears to have stable volume status currently      Suspect this cardiomyopathy is nonischemic based on the reported results of the prior cardiac cath (distal OM disease), would not explain the degree of LV dysfunction he has  AICD is in place  NSVT noted on tele from ER (currently doesn't want to be on tele), likely in setting of some hypokalemia (being repleted)      Plan:  Recommend continuing his stable heart failure therapy of lisinopril, metoprolol  If his creatinine is normalized, would continue his usual home PO lasix dose, as he was able to eat without a problem today    Needs to establish with cardiology, He is contemplating, but can follow with us in the formerly [facility] office    Will need to establish with device clinic as well, he is not sure of the company but we can get this info in the future    Non MI elevation in troponin, no need for further workup of this, stop trending as decreasing    OK for any procedures needed from a GI standpoint, if any    Also stable for discharge from cardiac standpoint if otherwise no additional evaluation planned       ED Triage Vitals   Temperature Pulse Respirations Blood Pressure SpO2   03/24/21 0151 03/24/21 0153 03/24/21 0153 03/24/21 0153 03/24/21 0153   98 1 °F (36 7 °C) 58 18 (!) 159/120 99 %      Temp Source Heart Rate Source Patient Position - Orthostatic VS BP Location FiO2 (%)   03/24/21 0151 03/24/21 0153 03/24/21 0153 03/24/21 0153 --   Oral Monitor Sitting Left arm       Pain Score       03/24/21 0153       7          Wt Readings from Last 1 Encounters:   03/24/21 115 kg (253 lb 8 5 oz)     Additional Vital Signs:   03/24/21 0908  --  86  --  114/63  --  --  --  --   03/24/21 0735  96 3 °F (35 7 °C)Abnormal   88  18  102/55  72  98 %  None (Room air)  Lying   03/24/21 0600  --  --  --  --  --  96 %  None (Room air)  --   03/24/21 0441  98 3 °F (36 8 °C)  100  22  122/98   --  100 %  None (Room air)  Lying   BP: right arm/manual at 03/24/21 0441   03/24/21 0340  --  --  --  142/98  --  98 %  None (Room air)  Lying   03/24/21 0324  --  97  20  119/98  --  98 %  --  --   03/24/21 0300  --  96  22  95/67  --  96 %  None (Room air)  Lying   03/24/21 0241  --  106Abnormal   --  117/100  --  99 %  None (Room air)  --   03/24/21 0222  --  --  --  159/120Abnormal   --  --  --       Pertinent Labs/Diagnostic Test Results:     Results from last 7 days   Lab Units 03/24/21  0208   WBC Thousand/uL 5 05   HEMOGLOBIN g/dL 13 7   HEMATOCRIT % 42 4   PLATELETS Thousands/uL 189   NEUTROS ABS Thousands/µL 2 78     Results from last 7 days   Lab Units 03/24/21  0208   SODIUM mmol/L 140   POTASSIUM mmol/L 3 5   CHLORIDE mmol/L 103   CO2 mmol/L 29   ANION GAP mmol/L 8   BUN mg/dL 14   CREATININE mg/dL 1 52*   EGFR ml/min/1 73sq m 62   CALCIUM mg/dL 8 6   MAGNESIUM mg/dL 1 9     Results from last 7 days   Lab Units 03/24/21  0208   AST U/L 25   ALT U/L 30   ALK PHOS U/L 53 1   TOTAL PROTEIN g/dL 6 7   ALBUMIN g/dL 3 0*   TOTAL BILIRUBIN mg/dL 1 27*     Results from last 7 days   Lab Units 03/24/21  0208   GLUCOSE RANDOM mg/dL 135     Results from last 7 days   Lab Units 03/24/21  0208   HEMOGLOBIN A1C % 6 1*   EAG mg/dl 128     Results from last 7 days   Lab Units 03/24/21  0658 03/24/21  0208   TROPONIN I ng/mL 0 12* 0 13*     Results from last 7 days   Lab Units 03/24/21  0215   LACTIC ACID mmol/L 1 7     Results from last 7 days   Lab Units 03/24/21  0658   NT-PRO BNP pg/mL 8,436*     Results from last 7 days   Lab Units 03/24/21  0208   LIPASE u/L 16        CHEST X RAY  Probable small left pleural effusion   Relative increased parenchymal density at the lung bases could represent atelectasis or developing pneumonia  Mild cardiomegaly  RIGHT UPPER QUADRANT ULTRASOUND  1   No evidence of cholelithiasis or cholecystitis  2   Hepatic and right renal cysts       ED Treatment:   Medication Administration from 03/24/2021 0141 to 03/24/2021 0518       Date/Time Order Dose Route Action     03/24/2021 0224 ondansetron (ZOFRAN) injection 4 mg 4 mg Intravenous Given     03/24/2021 0227 ketorolac (TORADOL) injection 30 mg 30 mg Intravenous Given     03/24/2021 0216 sodium chloride 0 9 % infusion 125 mL/hr Intravenous New Bag     03/24/2021 0222 cloNIDine (CATAPRES) tablet 0 2 mg 0 2 mg Oral Given     03/24/2021 0409 Lidocaine Viscous HCl (XYLOCAINE) 2 % mucosal solution 15 mL 15 mL Swish & Swallow Given     03/24/2021 0410 aluminum-magnesium hydroxide-simethicone (MYLANTA) oral suspension 30 mL 30 mL Oral Given     Past Medical History:   Diagnosis Date    [facility]     Hypertension     Pacemaker      Present on Admission:   (Resolved) Abdominal pain   DESHAWN (acute kidney injury) ([facility] )   CHF (congestive heart failure) ([place] [address] )   Essential (primary) hypertension   Mixed hyperlipidemia    Admitting Diagnosis: CHF (congestive heart failure) (HCC) [I50 9]  Abdominal pain [R10 9]  DESHAWN (acute kidney injury) ([facility] ) [Z17 7]  Chronic systolic congestive heart failure (HCC) [I50 22]  Elevated troponin I level [R77 8]    Age/Sex: 48 y o  male    Admission Orders:  Telemetry  VS q4hrs  Continuous Pulse Oximetry  Up + OOB as tolerated  Diet Cardiovascular; Cardiac  Daily weight  I+O q shift  Serial Troponin q3hrs x 2    Scheduled Medications:  Medications 03/23 03/24   aluminum-magnesium hydroxide-simethicone (MYLANTA) oral suspension 30 mL   Dose: 30 mL  Freq: Once Route: PO  Start: 03/24/21 0415 End: 03/24/21 0410     0410        aspirin (ECOTRIN LOW STRENGTH) EC tablet 81 mg   Dose: 81 mg  Freq: Daily Route: PO  Start: 03/24/21 0900 End: 03/24/21 1345    Admin Instructions:   Do Not Crush - Enteric coated  2559     1345-D/C'd      atorvastatin (LIPITOR) tablet 10 mg   Dose: 10 mg  Freq: Daily with dinner Route: PO  Start: 03/24/21 1630 End: 03/24/21 1345     1345-D/C'd      cloNIDine (CATAPRES) tablet 0 2 mg   Dose: 0 2 mg  Freq: Once Route: PO  Start: 03/24/21 0230 End: 03/24/21 0222     0222        clopidogrel (PLAVIX) tablet 75 mg   Dose: 75 mg  Freq: Daily Route: PO  Start: 03/24/21 0900 End: 03/24/21 1345     0908     1345-D/C'd      enoxaparin (LOVENOX) subcutaneous injection 40 mg   Dose: 40 mg  Freq: Daily Route: SC  Start: 03/24/21 0900 End: 03/24/21 0559     0559-D/C'd      furosemide (LASIX) tablet 20 mg   Dose: 20 mg  Freq: Daily Route: PO  Start: 03/24/21 0900 End: 03/24/21 0534     0534-D/C'd      heparin (porcine) subcutaneous injection 5,000 Units   Dose: 5,000 Units  Freq: Every 8 hours scheduled Route: SC  Start: 03/24/21 0600 End: 03/24/21 1345     0609     1345-D/C'd      ketorolac (TORADOL) injection 30 mg   Dose: 30 mg  Freq: Once Route: IV  Start: 03/24/21 0215 End: 03/24/21 0227     0227        Lidocaine Viscous HCl (XYLOCAINE) 2 % mucosal solution 15 mL   Dose: 15 mL  Freq:  Once Route: SWISH & SWAL  Start: 03/24/21 0415 End: 03/24/21 0409     0409        lisinopril (ZESTRIL) tablet 10 mg   Dose: 10 mg  Freq: Daily Route: PO  Start: 03/24/21 0900 End: 03/24/21 0612     0612-D/C'd      metoprolol tartrate (LOPRESSOR) tablet 50 mg   Dose: 50 mg  Freq: Every 12 hours scheduled Route: PO  Start: 03/24/21 0900 End: 03/24/21 1345     0908     1345-D/C'd      ondansetron (ZOFRAN) injection 4 mg Dose: 4 mg  Freq: Once Route: IV  Start: 03/24/21 0215 End: 03/24/21 0224     0224        pantoprazole (PROTONIX) injection 40 mg   Dose: 40 mg  Freq: Every 12 hours scheduled Route: IV  Start: 03/24/21 0530 End: 03/24/21 1345     0605     1345-D/C'd      potassium chloride (K-DUR,KLOR-CON) CR tablet 20 mEq   Dose: 20 mEq  Freq: Once Route: PO  Start: 03/24/21 0600 End: 03/24/21 0609     0609        potassium chloride (K-DUR,KLOR-CON) CR tablet 40 mEq   Dose: 40 mEq  Freq:  Once Route: PO  Start: 03/24/21 0815 End: 03/24/21 0908     0908        Medications 03/23 03/24     Continuous IV Infusions:  IVF sodium chloride 0 9 % infusion     Ordered Dose: 125 mL/hr Route: Intravenous Frequency: Continuous @ 125 mL/hr   Scheduled Start Date/Time: 03/24/21 0215        PRN Medications:  Medications 03/24   acetaminophen (TYLENOL) tablet 650 mg   Dose: 650 mg  Freq: Every 4 hours PRN Route: PO  PRN Reason: mild pain  Start: 03/24/21 0519 End: 03/24/21 1345    1345-D/C'd      aluminum-magnesium hydroxide-simethicone (MYLANTA) oral suspension 30 mL   Dose: 30 mL  Freq: Every 6 hours PRN Route: PO  PRN Reasons: indigestion,heartburn  Start: 03/24/21 0519 End: 03/24/21 1345    1345-D/C'd      bisacodyl (DULCOLAX) rectal suppository 10 mg   Dose: 10 mg  Freq: Daily PRN Route: RE  PRN Reason: constipation  Start: 03/24/21 0519 End: 03/24/21 1345    1345-D/C'd      HYDROmorphone (DILAUDID) injection 0 5 mg   Dose: 0 5 mg  Freq: Every 1 hour PRN Route: IV  PRN Reason: breakthrough pain  Start: 03/24/21 0519 End: 03/24/21 1345    1345-D/C'd      nitroglycerin (NITROSTAT) SL tablet 0 4 mg   Dose: 0 4 mg  Freq: Every 5 minutes PRN Route: SL  PRN Reason: chest pain  Start: 03/24/21 0519 End: 03/24/21 1345    1345-D/C'd      ondansetron (ZOFRAN) injection 4 mg   Dose: 4 mg  Freq: Every 6 hours PRN Route: IV  PRN Reasons: nausea,vomiting  Start: 03/24/21 0519 End: 03/24/21 4186    0614-D/C'd      oxyCODONE (ROXICODONE) immediate release tablet 10 mg   Dose: 10 mg  Freq: Every 4 hours PRN Route: PO  PRN Reason: severe pain  Start: 03/24/21 0519 End: 03/24/21 1345    1345-D/C'd      oxyCODONE (ROXICODONE) IR tablet 5 mg   Dose: 5 mg  Freq: Every 4 hours PRN Route: PO  PRN Reason: moderate pain  Start: 03/24/21 0519 End: 03/24/21 1345    1345-D/C'd      senna (SENOKOT) tablet 8 6 mg   Dose: 1 tablet  Freq: Daily at bedtime PRN Route: PO  PRN Reason: constipation  Indications of Use: CONSTIPATION  Start: 03/24/21 0519 End: 03/24/21 1345    1345-D/C'd      simethicone (MYLICON) chewable tablet 80 mg   Dose: 80 mg  Freq: 4 times daily PRN Route: PO  PRN Reason: flatulence  Start: 03/24/21 0519 End: 03/24/21 1345    1345-D/C'd      Medications 03/24     IP CONSULT TO CARDIOLOGY    Network Utilization Review Department  ATTENTION: Please call with any questions or concerns to [phone] and carefully listen to the prompts so that you are directed to the right person  All voicemails are confidential   Catia Justin all requests for admission clinical reviews, approved or denied determinations and any other requests to dedicated fax number below belonging to the campus where the patient is receiving treatment   List of dedicated fax numbers for the Facilities:  [address] DENIALS (Administrative/Medical Necessity) [phone]   [address] (Maternity/NICU/Pediatrics) [phone]   [address] [place] [address] [phone] [facility] [address] (  Lydia Gallo "Chiara" 103) [id number] [place] [address] [address]azaro Epperson [phone]   [place] [address] [phone]

## 2021-03-27 LAB
ALBUMIN SERPL BCP-MCNC: 2.5 G/DL (ref 3.5–5)
ALP SERPL-CCNC: 66 U/L (ref 46–116)
ALT SERPL W P-5'-P-CCNC: 107 U/L (ref 12–78)
ANION GAP SERPL CALCULATED.3IONS-SCNC: 7 MMOL/L (ref 4–13)
AST SERPL W P-5'-P-CCNC: 107 U/L (ref 5–45)
ATRIAL RATE: 107 BPM
ATRIAL RATE: 91 BPM
ATRIAL RATE: 93 BPM
BILIRUB SERPL-MCNC: 1.48 MG/DL (ref 0.2–1)
BUN SERPL-MCNC: 21 MG/DL (ref 5–25)
CALCIUM ALBUM COR SERPL-MCNC: 9.6 MG/DL (ref 8.3–10.1)
CALCIUM SERPL-MCNC: 8.4 MG/DL (ref 8.3–10.1)
CHLORIDE SERPL-SCNC: 106 MMOL/L (ref 100–108)
CO2 SERPL-SCNC: 23 MMOL/L (ref 21–32)
CREAT SERPL-MCNC: 1.94 MG/DL (ref 0.6–1.3)
ERYTHROCYTE [DISTWIDTH] IN BLOOD BY AUTOMATED COUNT: 15 % (ref 11.6–15.1)
GFR SERPL CREATININE-BSD FRML MDRD: 46 ML/MIN/1.73SQ M
GLUCOSE SERPL-MCNC: 104 MG/DL (ref 65–140)
HCT VFR BLD AUTO: 43.9 % (ref 36.5–49.3)
HGB BLD-MCNC: 14.4 G/DL (ref 12–17)
MCH RBC QN AUTO: 31.6 PG (ref 26.8–34.3)
MCHC RBC AUTO-ENTMCNC: 32.8 G/DL (ref 31.4–37.4)
MCV RBC AUTO: 97 FL (ref 82–98)
P AXIS: 58 DEGREES
P AXIS: 68 DEGREES
P AXIS: 77 DEGREES
PLATELET # BLD AUTO: 164 THOUSANDS/UL (ref 149–390)
PMV BLD AUTO: 10.6 FL (ref 8.9–12.7)
POTASSIUM SERPL-SCNC: 4.6 MMOL/L (ref 3.5–5.3)
PR INTERVAL: 164 MS
PR INTERVAL: 168 MS
PR INTERVAL: 174 MS
PROT SERPL-MCNC: 7 G/DL (ref 6.4–8.2)
QRS AXIS: -70 DEGREES
QRS AXIS: -76 DEGREES
QRS AXIS: 258 DEGREES
QRSD INTERVAL: 106 MS
QRSD INTERVAL: 106 MS
QRSD INTERVAL: 110 MS
QT INTERVAL: 324 MS
QT INTERVAL: 326 MS
QT INTERVAL: 328 MS
QTC INTERVAL: 402 MS
QTC INTERVAL: 403 MS
QTC INTERVAL: 435 MS
RBC # BLD AUTO: 4.55 MILLION/UL (ref 3.88–5.62)
SODIUM SERPL-SCNC: 136 MMOL/L (ref 136–145)
T WAVE AXIS: 29 DEGREES
T WAVE AXIS: 62 DEGREES
T WAVE AXIS: 63 DEGREES
TROPONIN I SERPL-MCNC: 0.18 NG/ML
TROPONIN I SERPL-MCNC: 0.2 NG/ML
TROPONIN I SERPL-MCNC: 0.21 NG/ML
VENTRICULAR RATE: 107 BPM
VENTRICULAR RATE: 91 BPM
VENTRICULAR RATE: 93 BPM
WBC # BLD AUTO: 5.93 THOUSAND/UL (ref 4.31–10.16)

## 2021-03-27 PROCEDURE — 84484 ASSAY OF TROPONIN QUANT: CPT | Performed by: STUDENT IN AN ORGANIZED HEALTH CARE EDUCATION/TRAINING PROGRAM

## 2021-03-27 PROCEDURE — 85027 COMPLETE CBC AUTOMATED: CPT | Performed by: INTERNAL MEDICINE

## 2021-03-27 PROCEDURE — 84484 ASSAY OF TROPONIN QUANT: CPT | Performed by: INTERNAL MEDICINE

## 2021-03-27 PROCEDURE — 93005 ELECTROCARDIOGRAM TRACING: CPT

## 2021-03-27 PROCEDURE — 93010 ELECTROCARDIOGRAM REPORT: CPT | Performed by: INTERNAL MEDICINE

## 2021-03-27 PROCEDURE — 99220 PR INITIAL OBSERVATION CARE/DAY 70 MINUTES: CPT | Performed by: INTERNAL MEDICINE

## 2021-03-27 PROCEDURE — 36415 COLL VENOUS BLD VENIPUNCTURE: CPT | Performed by: INTERNAL MEDICINE

## 2021-03-27 PROCEDURE — 80053 COMPREHEN METABOLIC PANEL: CPT | Performed by: INTERNAL MEDICINE

## 2021-03-27 RX ORDER — MAGNESIUM HYDROXIDE/ALUMINUM HYDROXICE/SIMETHICONE 120; 1200; 1200 MG/30ML; MG/30ML; MG/30ML
30 SUSPENSION ORAL EVERY 6 HOURS PRN
Status: DISCONTINUED | OUTPATIENT
Start: 2021-03-27 | End: 2021-03-29

## 2021-03-27 RX ORDER — HEPARIN SODIUM 5000 [USP'U]/ML
5000 INJECTION, SOLUTION INTRAVENOUS; SUBCUTANEOUS EVERY 8 HOURS SCHEDULED
Status: DISCONTINUED | OUTPATIENT
Start: 2021-03-27 | End: 2021-03-31 | Stop reason: HOSPADM

## 2021-03-27 RX ORDER — ACETAMINOPHEN 325 MG/1
650 TABLET ORAL EVERY 6 HOURS PRN
Status: DISCONTINUED | OUTPATIENT
Start: 2021-03-27 | End: 2021-03-31 | Stop reason: HOSPADM

## 2021-03-27 RX ORDER — PANTOPRAZOLE SODIUM 40 MG/1
40 TABLET, DELAYED RELEASE ORAL
Status: DISCONTINUED | OUTPATIENT
Start: 2021-03-27 | End: 2021-03-31 | Stop reason: HOSPADM

## 2021-03-27 RX ORDER — HEPARIN SODIUM 5000 [USP'U]/ML
5000 INJECTION, SOLUTION INTRAVENOUS; SUBCUTANEOUS EVERY 8 HOURS SCHEDULED
Status: DISCONTINUED | OUTPATIENT
Start: 2021-03-27 | End: 2021-03-27

## 2021-03-27 RX ORDER — SODIUM CHLORIDE 9 MG/ML
75 INJECTION, SOLUTION INTRAVENOUS CONTINUOUS
Status: DISCONTINUED | OUTPATIENT
Start: 2021-03-27 | End: 2021-03-27

## 2021-03-27 RX ADMIN — CLOPIDOGREL BISULFATE 75 MG: 75 TABLET, FILM COATED ORAL at 10:40

## 2021-03-27 RX ADMIN — ASPIRIN 81 MG: 81 TABLET, COATED ORAL at 10:40

## 2021-03-27 RX ADMIN — METOPROLOL TARTRATE 50 MG: 50 TABLET, FILM COATED ORAL at 00:48

## 2021-03-27 RX ADMIN — ATORVASTATIN CALCIUM 10 MG: 10 TABLET, FILM COATED ORAL at 17:37

## 2021-03-27 RX ADMIN — METOPROLOL TARTRATE 50 MG: 50 TABLET, FILM COATED ORAL at 10:40

## 2021-03-27 RX ADMIN — METOPROLOL TARTRATE 50 MG: 50 TABLET, FILM COATED ORAL at 20:14

## 2021-03-27 RX ADMIN — HEPARIN SODIUM 5000 UNITS: 5000 INJECTION INTRAVENOUS; SUBCUTANEOUS at 10:41

## 2021-03-27 RX ADMIN — SODIUM CHLORIDE 75 ML/HR: 0.9 INJECTION, SOLUTION INTRAVENOUS at 00:48

## 2021-03-27 RX ADMIN — HEPARIN SODIUM 5000 UNITS: 5000 INJECTION INTRAVENOUS; SUBCUTANEOUS at 17:37

## 2021-03-27 RX ADMIN — PANTOPRAZOLE SODIUM 40 MG: 40 TABLET, DELAYED RELEASE ORAL at 06:29

## 2021-03-27 RX ADMIN — HEPARIN SODIUM 5000 UNITS: 5000 INJECTION INTRAVENOUS; SUBCUTANEOUS at 00:48

## 2021-03-27 NOTE — H&P
INTERNAL MEDICINE RESIDENCY ADMISSION H&P     Name: Hien Levy   Age & Sex: 48 y o  male   MRN: [id number]  Unit/Bed#: ED 15   Encounter: [id number]  Primary Care Provider: No primary care provider on file  Code Status: Level 1 - Full Code  Admission Status: OBSERVATION  Disposition: Patient requires Med/Surg with Telemetry    Admit to team: SOD Team B     ASSESSMENT/PLAN     Principal Problem:    Abdominal pain  Active Problems:    DESHAWN (acute kidney injury) ([facility] )    Chronic systolic congestive heart failure (HCC)    Elevated troponin I level    Atherosclerotic heart disease of native coronary artery without angina pectoris    Essential (primary) hypertension      * Abdominal pain  Assessment & Plan  Constant epigastric/RUQ abdominal pain that intermittently worsens after eating, laying flat on his back and when he is walking  Likely gastritis related pain  Recent RUQ US showed hepatic and R renal cysts, but no signs of cholecystitis  -received mylanta, lidocaine viscous, carafrate and protonix in the ED  -mylanta PRN  -continue protonix 40mg in the morning  -will do clear liquid diet for now, advance as tolerated    DESHAWN (acute kidney injury) ([facility] )  Assessment & Plan  Presented with Cr of 1 84 on admission (baseline likely 1 1-1 2) in the setting of poor PO intake due to his abdominal pain    -will do gentle hydration with normal saline at 75cc/hr  -holding home medications: lasix 40mg and lisinopril 10mg  -trend Cr    Chronic systolic congestive heart failure (HCC)  Assessment & Plan  Wt Readings from Last 3 Encounters:   03/26/21 111 kg (245 lb)   03/24/21 115 kg (253 lb 8 5 oz)   10/13/20 104 kg (230 lb)        Echocardiogram from 47/7255 showed systolic function was severely reduced with ejection fraction estimated to be 20 % with severe diffuse hypokinesis    Has single chamber AICD in place  Seen by cardiology during recent admission at [address] metoprolol 50mg BID, plavix, aspirin and statin  -Lasix and lisinopril on hold due to DESHAWN  -Will need to establish outpatient follow up with cardiology as pt is new to the area    Elevated troponin I level  Assessment & Plan  Chronic elevation in troponin btwn 0 12-0  19  Likely non MI related with stable EKG on admission  Patient was evaluated by Cardiology during recent admission at [facility] and they also thought troponin elevation was not on MI related  -Will check serial troponin and EKG   -Monitor telemetry    Essential (primary) hypertension  Assessment & Plan  /111 in the ED was likely erroneous reading, repeat SBP stable at 140s    -holding lisinopril 10mg in the setting of DESHAWN  -continue with home medication metoprolol tartrate 50mg      VTE Pharmacologic Prophylaxis: Heparin  VTE Mechanical Prophylaxis: sequential compression device    CHIEF COMPLAINT     Chief Complaint   Patient presents with    Abdominal Pain     Pt c/o epigastric pain, states he feels his food isn't going down  Seen at [facility] 3 days ago, no relief with meds  Also c/o SOB on exertion  HISTORY OF PRESENT ILLNESS     Elizabet Phelps is a 47 yo M with hx of HTN, HFrEF with EF of 20% with AICD in place, recent admission to [facility] for abdominal pain on 3/24/21, who [facility] to [facility] ED today for the same epigastric/RUQ abdominal pain  Pt reports the pain initially started 1 day prior to his admission at [facility] with no significant improvement after being discharged from the hospital  Pt describes the pain as constant with intermittent worsening in severity after eating food, laying flat in his back and with walking  No fever, chills, nausea, vomiting, diarrhea, constipation, blood in stool  No prior history of similar pain  Denies having any metallic taste in his mouth or heart burn sxs  Also without any chest pain, SOB, or palpitations  Patient denied any alcohol consumption, he is on aspirin on a daily basis  RUQ US at [facility] was negative for cholecystitis   Reported to have improvement in symptoms on discharge after receiving PPI  Pt was also seen by Cardiology during that admission due to slight elevation in troponin, which was thought to be due to non MI elevation  No further workup for was recommended as it was downtrending slightly  Pt recently moved to the area from [place] and has yet to establish care with PCP or cardiology  REVIEW OF SYSTEMS     Review of Systems   Constitutional: Negative for chills, diaphoresis and fever  HENT: Negative for congestion and sinus pressure  Respiratory: Negative for cough and shortness of breath  Cardiovascular: Negative for chest pain, palpitations and leg swelling  Gastrointestinal: Positive for abdominal pain  Negative for abdominal distention, blood in stool, constipation, diarrhea, nausea and vomiting  Genitourinary: Negative for dysuria and hematuria  Musculoskeletal: Negative for back pain and neck pain  Skin: Negative for color change and rash  Neurological: Negative for dizziness, syncope and headaches  Psychiatric/Behavioral: Negative for confusion  The patient is not nervous/anxious  OBJECTIVE     Vitals:    21 2047 21 2245 21 2351 21 0047   BP:  (!) 231/111 138/99 114/68   BP Location:  Left arm Right arm Left arm   Pulse: (!) 106 104 105 103   Resp: (!) 43 (!) 41 (!) 40 (!) 23   Temp: 98 4 °F (36 9 °C)      TempSrc:       SpO2: 98% 96% 95% 99%   Weight:       Height:          Temperature:   Temp (24hrs), Av 4 °F (36 9 °C), Min:98 3 °F (36 8 °C), Max:98 4 °F (36 9 °C)    Temperature: 98 4 °F (36 9 °C)  Intake & Output:  I/O     None        Weights:   IBW: 70 7 kg    Body mass index is 36 18 kg/m²  Weight (last 2 days)     Date/Time   Weight    21 1838   111 (245)            Physical Exam  Constitutional:       General: He is not in acute distress  Appearance: He is obese  HENT:      Head: Normocephalic and atraumatic        Mouth/Throat:      Mouth: Mucous membranes are moist    Eyes:      General: No scleral icterus  Conjunctiva/sclera: Conjunctivae normal       Pupils: Pupils are equal, round, and reactive to light  Neck:      Musculoskeletal: Normal range of motion and neck supple  Cardiovascular:      Rate and Rhythm: Normal rate and regular rhythm  Heart sounds: Normal heart sounds  Pulmonary:      Effort: Pulmonary effort is normal  No respiratory distress  Breath sounds: Normal breath sounds  Abdominal:      Palpations: Abdomen is soft  There is no mass  Tenderness: There is no guarding or rebound  Comments: Moderate epigastric TTP w/  mild TTP over RUQ   Musculoskeletal:      Comments: Trace pedal edema bilaterally   Skin:     General: Skin is warm  Neurological:      General: No focal deficit present  Mental Status: He is alert and oriented to person, place, and time  Psychiatric:         Mood and Affect: Mood normal        PAST MEDICAL HISTORY     Past Medical History:   Diagnosis Date    CHF [facility]congestive heart failure) ([facility] [address] [facility]     Hypertension     Pacemaker      PAST SURGICAL HISTORY   History reviewed  No pertinent surgical history  SOCIAL & FAMILY HISTORY     Social History     Substance and Sexual Activity   Alcohol Use Not Currently     Substance and Sexual Activity   Alcohol Use Not Currently        Substance and Sexual Activity   Drug Use Yes    Types: Marijuana     Social History     Tobacco Use   Smoking Status Never Smoker   Smokeless Tobacco Never Used     History reviewed  No pertinent family history  LABORATORY DATA     Labs: I have personally reviewed pertinent reports      Results from last 7 days   Lab Units 03/26/21 2039 03/24/21  0208   WBC Thousand/uL 5 56 5 05   HEMOGLOBIN g/dL 14 3 13 7   HEMATOCRIT % 44 5 42 4   PLATELETS Thousands/uL 176 189   NEUTROS PCT % 65 55   MONOS PCT % 12 17*      Results from last 7 days   Lab Units 03/26/21 2039 03/24/21  0208   POTASSIUM mmol/L 4 4 3 5 CHLORIDE mmol/L 107 103   CO2 mmol/L 23 29   BUN mg/dL 18 14   CREATININE mg/dL 1 84* 1 52*   CALCIUM mg/dL 8 4 8 6   ALK PHOS U/L 66 53 1   ALT U/L 98* 30   AST U/L 90* 25     Results from last 7 days   Lab Units 03/24/21  0208   MAGNESIUM mg/dL 1 9              Results from last 7 days   Lab Units 03/24/21  0215   LACTIC ACID mmol/L 1 7     Results from last 7 days   Lab Units 03/26/21  2352 03/26/21  2039 03/24/21  0658   TROPONIN I ng/mL 0 18* 0 19* 0 12*     Micro:  No results found for: Sarah Rajan, SPUTUMCULTUR  IMAGING & DIAGNOSTIC TESTS     Imaging: I have personally reviewed pertinent reports  No results found  EKG, Pathology, and Other Studies: I have personally reviewed pertinent reports  ALLERGIES   No Known Allergies  MEDICATIONS PRIOR TO ARRIVAL     Prior to Admission medications    Medication Sig Start Date End Date Taking?  Authorizing Provider   aluminum-magnesium hydroxide-simethicone (MYLANTA) 200-200-20 mg/5 mL suspension Take 30 mL by mouth every 6 (six) hours as needed for indigestion or heartburn for up to 14 days 3/24/21 4/7/21  Sowmya Kaur MD   aspirin (ECOTRIN LOW STRENGTH) 81 mg EC tablet Take 1 tablet (81 mg total) by mouth daily 10/15/20   Joao Archer MD   atorvastatin (LIPITOR) 10 mg tablet Take 1 tablet (10 mg total) by mouth daily with dinner 10/14/20   Joao Archer MD   clopidogrel (PLAVIX) 75 mg tablet Take 1 tablet (75 mg total) by mouth daily 10/15/20   Joao Archer MD   famotidine (PEPCID) 20 mg tablet Take 1 tablet (20 mg total) by mouth daily for 14 days 3/24/21 4/7/21  Sowmya Kaur MD   furosemide (Lasix) 20 mg tablet Take 2 tablets (40 mg total) by mouth daily 3/24/21   Sowmya Kaur MD   lisinopril (ZESTRIL) 10 mg tablet Take 1 tablet (10 mg total) by mouth daily 10/15/20   Joao Archer MD   metoprolol tartrate (LOPRESSOR) 50 mg tablet Take 1 tablet (50 mg total) by mouth every 12 (twelve) hours 10/14/20   Joao Archer MD   potassium chloride (KLOR-CON) 20 mEq packet Take 20 mEq by mouth daily 3/24/21 5/23/21  Carrie Strong MD     MEDICATIONS ADMINISTERED IN LAST 24 HOURS     Medication Administration - last 24 hours from 03/26/2021 0106 to 03/27/2021 0106       Date/Time Order Dose Route Action Action by     03/26/2021 2055 sucralfate (CARAFATE) tablet 1 g 1 g Oral Given Jose Jean Baptiste RN     03/26/2021 2056 Lidocaine Viscous HCl (XYLOCAINE) 2 % mucosal solution 15 mL 15 mL Swish & Swallow Given Jose Jean Baptiste RN     03/26/2021 2055 aluminum-magnesium hydroxide-simethicone (MYLANTA) oral suspension 30 mL 30 mL Oral Given Jose Jean Baptiste RN     03/26/2021 2211 ondansetron (ZOFRAN) injection 4 mg 4 mg Intravenous Given Jose Jean Baptiste RN     03/26/2021 2254 pantoprazole (PROTONIX) injection 40 mg 40 mg Intravenous Given Jose Jean Baptiste RN     03/27/2021 0048 metoprolol tartrate (LOPRESSOR) tablet 50 mg 50 mg Oral Given Jose Jean Baptiste RN     03/27/2021 0048 heparin (porcine) subcutaneous injection 5,000 Units 5,000 Units Subcutaneous Given Jose Jean Baptiste RN     03/27/2021 0048 sodium chloride 0 9 % infusion 75 mL/hr Intravenous New Bag Jose Jean Baptiste RN        CURRENT MEDICATIONS     Current Facility-Administered Medications   Medication Dose Route Frequency Provider Last Rate    acetaminophen  650 mg Oral Q6H PRN Renita Cheung, DO      aluminum-magnesium hydroxide-simethicone  30 mL Oral Q6H PRN Renita Cheung, DO      aspirin  81 mg Oral Daily Renita Cheung DO      atorvastatin  10 mg Oral Daily With Josh Melgar, DO      clopidogrel  75 mg Oral Daily Renita Cheung DO      heparin (porcine)  5,000 Units Subcutaneous [facility] Renita Cheung DO      metoprolol tartrate  50 mg Oral Q12H [address] Renitabhaskar Cheung, DO      pantoprazole  40 mg Oral Early Morning Wilner Tarango MD      sodium chloride  75 mL/hr Intravenous Continuous Renita Cheung DO 75 mL/hr (03/27/21 0048)     sodium chloride, 75 mL/hr, Last Rate: 75 mL/hr (03/27/21 0048)      acetaminophen, 650 mg, Q6H PRN  aluminum-magnesium hydroxide-simethicone, 30 mL, Q6H PRN        Admission Time  I spent 30 minutes admitting the patient  This involved direct patient contact where I performed a full history and physical, reviewing previous records, and reviewing laboratory and other diagnostic studies  Portions of the record may have been created with voice recognition software  Occasional wrong word or "sound a like" substitutions may have occurred due to the inherent limitations of voice recognition software    Read the chart carefully and recognize, using context, where substitutions have occurred     ==  Wood Causey MD  [address]  Internal Medicine Residency PGY-1

## 2021-03-27 NOTE — PROGRESS NOTES
INTERNAL MEDICINE RESIDENCY SENIOR ADMISSION NOTE     Name: Ángel Burrell   Age & Sex: 48 y o  male   MRN: [id number]  Unit/Bed#: ED 15   Encounter: [id number]  Primary Care Provider: No primary care provider on file  Admit to team: SOD Team B     Patient seen and examined  Reviewed H&P per Dr Mustapha Ch  Agree with the assessment and plan with any exception/addition as noted below:    Principal Problem:    Abdominal pain  Active Problems:    Atherosclerotic heart disease of native coronary artery without angina pectoris    Chronic systolic congestive heart failure (HCC)    Essential (primary) hypertension    Elevated troponin I level    DESHAWN (acute kidney injury) [facility])    53-year-old male with past medical history significant for hypertension, hyperlipidemia, and HFrEF with EF 20%, and AICD in place  Patient had been seen as [address] [place] [address] in [facility] the day before presenting to [employer]  While at [address] [place] [address] in [facility] patient had cardiac workup including troponins x3 that were 0 12, 0 13, 0 12  Patient was also evaluated by Cardiology at that time and they thought troponin elevation was not on MI related  Patient was discharged and told to follow-up with primary care provider and also establish with cardiologist in the area  Patient recently moved here from [place] and has not established with any healthcare providers  Patient again presented secondary to abdominal pain that he describes as an aching pain that is constant and sometimes increases in intensity  He states it is worse when eating, lying flat, and also when walking  This has been going on since 03/23 and patient states while at outside hospital his abdominal pain did not resolve  Will trend patient's troponins and treat for gastritis at this time  As far as patient's elevated blood pressure may have been secondary to pain and upon recheck blood pressure was back to within normal limits    Will also give patient gentle hydration with 75 mL/hour given heart failure to help with acute kidney injury  Patient did state he was taking his home medications which include lisinopril and this may have exacerbated creatinine elevation versus poor p o  Intake  If patient's troponins come back normal likely can be discharged tomorrow and continue with plan of following up with Cardiology and establishing with primary care provider        Code Status: Level 1 - Full Code  Admission Status: OBSERVATION  Disposition: Patient requires Med/Surg with Telemetry  Expected Length of Stay: 1-2 days

## 2021-03-27 NOTE — ASSESSMENT & PLAN NOTE
Wt Readings from Last 3 Encounters:   03/26/21 111 kg (245 lb)   03/24/21 115 kg (253 lb 8 5 oz)   10/13/20 104 kg (230 lb)        Echocardiogram from 68/6045 showed systolic function was severely reduced with ejection fraction estimated to be 20 % with severe diffuse hypokinesis  Has single chamber AICD in place  Seen by cardiology during recent admission at [place]   Patient appears euvolemic, denies chest pain, shortness of breath, palpitation, headaches, lightheadedness    -Continue metoprolol 50mg BID, plavix, aspirin and statin  -Lasix and lisinopril were on hold due to DESHAWN  - kidney function improving, will start  Lasix 20 mg daily in the a m , continue to hold lisinopril until outpatient follow-up with Nephrology in one week  - continue I&O, weight measurement, cardiac diet 2 g sodium  -Will need to establish outpatient follow up with cardiology Dr Amarjit Dominique as he is new to the area   Contact information to schedule appointment has been given to patient

## 2021-03-27 NOTE — UTILIZATION REVIEW
Initial Clinical Review    Admission: Date/Time/Statement:   Admission Orders (From admission, onward)     Ordered        03/26/21 2337  Place in Observation  Once                   Orders Placed This Encounter   Procedures    Place in Observation     Standing Status:   Standing     Number of Occurrences:   1     Order Specific Question:   Level of Care     Answer:   Med Surg [16]     ED Arrival Information     Expected Arrival Acuity Means of Arrival Escorted By Service Admission Type    - 3/26/2021 18:08 Urgent Ambulance [facility] General Medicine Urgent    Arrival Complaint    abd pain        Chief Complaint   Patient presents with    Abdominal Pain     Pt c/o epigastric pain, states he feels his food isn't going down  Seen at [facility] 3 days ago, no relief with meds  Also c/o SOB on exertion  Assessment/Plan:  49 y/o male with PMHx of HTN, HFrEF with EF of 20% with AICD who presents to ED via EMS with epigastric/RUQ abdominal pain  Pt recently admitted to [facility] on 3/24 with same complaint  During previous w/u RUQ neg for cholecystitis and reported having improvement on d/c after having PPI  Cr 1 52  Trop elevated  Mylanta, lidocaine viscous, carafrate and protonix given in ED today  Admit observation to M/S unit plan for mylanta prn  Continue protonix daily  Clear liquid diet, advance as wendy  Trend Cr  Hold home lasix and lisinopril but continue metoprolol  Chronic elevation in troponin btwn 0 12-0 19, eval'd last admission by cardiology  Continue to monitor trops  EKG  Telemetry        ED Triage Vitals [03/26/21 1838]   Temperature Pulse Respirations Blood Pressure SpO2   98 3 °F (36 8 °C) (!) 53 18 (!) 143/121 100 %      Temp Source Heart Rate Source Patient Position - Orthostatic VS BP Location FiO2 (%)   Oral Monitor Sitting Left arm --      Pain Score       7          Wt Readings from Last 1 Encounters:   03/26/21 111 kg (245 lb)     Additional Vital Signs:   Date/Time  Temp Pulse  Resp  BP  MAP (mmHg)  SpO2  O2 Device  Patient Position - Orthostatic VS   03/27/21 09:29:35  100 1 °F (37 8 °C)  86  20  121/93  102  97 %  --  --   03/27/21 0635  --  88  18  152/104Abnormal   122  96 %  None (Room air)  Lying   03/27/21 0302  --  90  29Abnormal   129/77  --  99 %  None (Room air)  Sitting   03/27/21 0047  --  103  23Abnormal   114/68  77  99 %  None (Room air)  Lying   03/26/21 2351  --  105  40Abnormal   138/99  113  95 %  None (Room air)  Lying   03/26/21 2245  --  104  41Abnormal   231/111Abnormal   135  96 %  None (Room air)  Lying   03/26/21 2047  98 4 °F (36 9 °C)  106Abnormal   43Abnormal   --  --  98 %  --  Lying   03/26/21 1838  98 3 °F (36 8 °C)  53Abnormal   18  143/121Abnormal   --  100 %  None (Room air)  Sitting       Pertinent Labs/Diagnostic Test Results:   CXR 3/27 --   1   Stable cardiomegaly  2   Clear lungs         Results from last 7 days   Lab Units 03/27/21 0628 03/26/21 2039 03/24/21  0208   WBC Thousand/uL 5 93 5 56 5 05   HEMOGLOBIN g/dL 14 4 14 3 13 7   HEMATOCRIT % 43 9 44 5 42 4   PLATELETS Thousands/uL 164 176 189   NEUTROS ABS Thousands/µL  --  3 55 2 78         Results from last 7 days   Lab Units 03/27/21 0628 03/26/21 2039 03/24/21  0208   SODIUM mmol/L 136 137 140   POTASSIUM mmol/L 4 6 4 4 3 5   CHLORIDE mmol/L 106 107 103   CO2 mmol/L 23 23 29   ANION GAP mmol/L 7 7 8   BUN mg/dL 21 18 14   CREATININE mg/dL 1 94* 1 84* 1 52*   EGFR ml/min/1 73sq m 46 49 62   CALCIUM mg/dL 8 4 8 4 8 6   MAGNESIUM mg/dL  --   --  1 9     Results from last 7 days   Lab Units 03/27/21 0628 03/26/21 2039 03/24/21  0208   AST U/L 107* 90* 25   ALT U/L 107* 98* 30   ALK PHOS U/L 66 66 53 1   TOTAL PROTEIN g/dL 7 0 7 4 6 7   ALBUMIN g/dL 2 5* 2 7* 3 0*   TOTAL BILIRUBIN mg/dL 1 48* 1 31* 1 27*         Results from last 7 days   Lab Units 03/27/21  0628 03/26/21 2039 03/24/21  0208   GLUCOSE RANDOM mg/dL 104 127 135         Results from last 7 days   Lab Units 03/24/21  0208   HEMOGLOBIN A1C % 6 1*   EAG mg/dl 128       Results from last 7 days   Lab Units 03/27/21  0628 03/27/21  0303 03/26/21  2352 03/26/21 2039 03/24/21  0658 03/24/21  0208   TROPONIN I ng/mL 0 20* 0 21* 0 18* 0 19* 0 12* 0 13*     Results from last 7 days   Lab Units 03/24/21  0215   LACTIC ACID mmol/L 1 7     Results from last 7 days   Lab Units 03/24/21  0658   NT-PRO BNP pg/mL 8,436*       Results from last 7 days   Lab Units 03/26/21 2039 03/24/21  0208   LIPASE u/L 108 16         ED Treatment:   Medication Administration from 03/26/2021 1808 to 03/27/2021 0850       Date/Time Order Dose Route Action     03/26/2021 2055 sucralfate (CARAFATE) tablet 1 g 1 g Oral Given     03/26/2021 2056 Lidocaine Viscous HCl (XYLOCAINE) 2 % mucosal solution 15 mL 15 mL Swish & Swallow Given     03/26/2021 2055 aluminum-magnesium hydroxide-simethicone (MYLANTA) oral suspension 30 mL 30 mL Oral Given     03/26/2021 2211 ondansetron (ZOFRAN) injection 4 mg 4 mg Intravenous Given     03/26/2021 2254 pantoprazole (PROTONIX) injection 40 mg 40 mg Intravenous Given     03/27/2021 0048 metoprolol tartrate (LOPRESSOR) tablet 50 mg 50 mg Oral Given     03/27/2021 0603 heparin (porcine) subcutaneous injection 5,000 Units 5,000 Units Subcutaneous Not Given     03/27/2021 0048 heparin (porcine) subcutaneous injection 5,000 Units 5,000 Units Subcutaneous Given     03/27/2021 0048 sodium chloride 0 9 % infusion 75 mL/hr Intravenous New Bag     03/27/2021 0629 pantoprazole (PROTONIX) EC tablet 40 mg 40 mg Oral Given        Past Medical History:   Diagnosis Date    [facility]congest[facility] heart failure) ([facility] )     Hypertension     Pacemaker      Present on Admission:   Atherosclerotic heart disease of native coronary artery without angina pectoris   [facility]   Essential (primary) hypertension   Elevated troponin I level   DESHAWN (acute kidney injury) ([address] )   Abdominal pain      Admitting Diagnosis: Nausea [R11 0]  Epigastric pain [R10 13]  Abdominal pain [R10 9]  Elevated troponin I level [R77 8]  Age/Sex: 48 y o  male  Admission Orders:  Scheduled Medications:  aspirin, 81 mg, Oral, Daily  atorvastatin, 10 mg, Oral, Daily With Dinner  clopidogrel, 75 mg, Oral, Daily  heparin (porcine), 5,000 Units, Subcutaneous, Q8H KIKO  metoprolol tartrate, 50 mg, Oral, Q12H KIKO  pantoprazole, 40 mg, Oral, Early Morning    Continuous IV Infusions:  sodium chloride, 75 mL/hr, Intravenous, Continuous    PRN Meds:  acetaminophen, 650 mg, Oral, Q6H PRN  aluminum-magnesium hydroxide-simethicone, 30 mL, Oral, Q6H PRN        IP CONSULT TO CASE MANAGEMENT    Network Utilization Review Department  ATTENTION: Please call with any questions or concerns to [phone] and carefully listen to the prompts so that you are directed to the right person  All voicemails are confidential   Neetu Matute all requests for admission clinical reviews, approved or denied determinations and any other requests to dedicated fax number below belonging to the campus where the patient is receiving treatment   List of dedicated fax numbers for the Facilities:  [address] DENIALS (Administrative/Medical Necessity) [phone]   [address] (Maternity/NICU/Pediatrics) [phone]   [address] Byron Pate 4384 (Missy Gallo "Chiara" 103) [address] [place] [address] [address] [place] [address] [phone]

## 2021-03-27 NOTE — ASSESSMENT & PLAN NOTE
Constant epigastric/RUQ abdominal pain that intermittently worsens after eating, laying flat on his back and when he is walking  Likely peptic ulcer disease vs gastritis  Recent RUQ US showed hepatic and R renal cysts, but no signs of cholecystitis      -received mylanta, lidocaine viscous, carafrate and protonix in the ED  -mylanta PRN  -continue protonix 40mg daily  - recommend outpatient colonoscopy for colon cancer screening  -patient now on regular diet

## 2021-03-27 NOTE — ED NOTES
Message alerted pt positive for sepsis protocol  Dr Savanah Grigsby notified       Reinaldo Cartagena RN  03/26/21 9142

## 2021-03-27 NOTE — ED NOTES
Patient ambulatory to bathroom  Steady on feet   No reports of dizziness/lightheadedness     Susana Fuentes RN  03/26/21 8285

## 2021-03-27 NOTE — ASSESSMENT & PLAN NOTE
Presented with Cr of 1 84 on admission (baseline likely 1 1-1 4) in the setting of poor PO intake due to his abdominal pain  Most recent Creatinine 1 97  worsening kidney function in the setting of CHF with low EF of 20%  Likely pre renal,  FENA 0 2%   He is s/p gentle hydration will with IV fluids with no improvement in kidney function  He appears euvolemic   - Urinalysis with micro 3/28/21 reports:  SG 1 029, large blood,+4 proteinuria, hyaline casts 0-3  Urine sodium-21, Right upper quadrant reveals right kidney measuring 11 1 x 4 7 x 5 5 cm with mid pole right kidney cyst measuring 1 2 x 0 8 x 0 9 cm   Plan  - nephrology consulted recommendation appreciated  - Will continue to hold lisinopril 10 mg -nephrology will evaluate and resume with outpatient appointment  -  home medications: lasix 40mg and lisinopril 10mg were held due to DESHAWN  - Per nephrology, resume lasix 20mg today 3/31/21 since kidney function is improving  -trend Cr/follow-up BMP in one week

## 2021-03-27 NOTE — ASSESSMENT & PLAN NOTE
· Home medications include:  Furosemide 40 mg daily, lisinopril 10 mg daily, Lopressor 50 mg every 12 hours  · Current medications include:  Lopressor 50 mg every 12 hours, Lasix 20mg daily  · Avoid hypotension or fluctuations in blood pressure  · Continue to hold lisinopril 10mg in the setting of DESHAWN until visi to nephrology in one week

## 2021-03-27 NOTE — PLAN OF CARE
Problem: GASTROINTESTINAL - ADULT  Goal: Maintains or returns to baseline bowel function  Description: INTERVENTIONS:  - Assess bowel function  - Encourage oral fluids to ensure adequate hydration  - Administer IV fluids if ordered to ensure adequate hydration  - Administer ordered medications as needed  - Encourage mobilization and activity  - Consider nutritional services referral to assist patient with adequate nutrition and appropriate food choices  Outcome: Progressing  Goal: Maintains adequate nutritional intake  Description: INTERVENTIONS:  - Monitor percentage of each meal consumed  - Identify factors contributing to decreased intake, treat as appropriate  - Assist with meals as needed  - Monitor I&O, weight, and lab values if indicated  - Obtain nutrition services referral as needed  Outcome: Progressing     Problem: GENITOURINARY - ADULT  Goal: Maintains or returns to baseline urinary function  Description: INTERVENTIONS:  - Assess urinary function  - Encourage oral fluids to ensure adequate hydration if ordered  - Administer IV fluids as ordered to ensure adequate hydration  - Administer ordered medications as needed  - Offer frequent toileting  - Follow urinary retention protocol if ordered  Outcome: Progressing     Problem: METABOLIC, FLUID AND ELECTROLYTES - ADULT  Goal: Electrolytes maintained within normal limits  Description: INTERVENTIONS:  - Monitor labs and assess patient for signs and symptoms of electrolyte imbalances  - Administer electrolyte replacement as ordered  - Monitor response to electrolyte replacements, including repeat lab results as appropriate  - Instruct patient on fluid and nutrition as appropriate  Outcome: Progressing     Problem: SKIN/TISSUE INTEGRITY - ADULT  Goal: Skin integrity remains intact  Description: INTERVENTIONS  - Identify patients at risk for skin breakdown  - Assess and monitor skin integrity  - Assess and monitor nutrition and hydration status  - Monitor labs (i e  albumin)  - Assess for incontinence   - Turn and reposition patient  - Assist with mobility/ambulation  - Relieve pressure over bony prominences  - Avoid friction and shearing  - Provide appropriate hygiene as needed including keeping skin clean and dry  - Evaluate need for skin moisturizer/barrier cream  - Collaborate with interdisciplinary team (i e  Nutrition, Rehabilitation, etc )   - Patient/family teaching  Outcome: Progressing     Problem: PAIN - ADULT  Goal: Verbalizes/displays adequate comfort level or baseline comfort level  Description: Interventions:  - Encourage patient to monitor pain and request assistance  - Assess pain using appropriate pain scale  - Administer analgesics based on type and severity of pain and evaluate response  - Implement non-pharmacological measures as appropriate and evaluate response  - Consider cultural and social influences on pain and pain management  - Notify physician/advanced practitioner if interventions unsuccessful or patient reports new pain  Outcome: Progressing     Problem: SAFETY ADULT  Goal: Patient will remain free of falls  Description: INTERVENTIONS:  - Assess patient frequently for physical needs  -  Identify cognitive and physical deficits and behaviors that affect risk of falls    -  [place] fall precautions as indicated by assessment   - Educate patient/family on patient safety including physical limitations  - Instruct patient to call for assistance with activity based on assessment  - Modify environment to reduce risk of injury  - Consider OT/PT consult to assist with strengthening/mobility  Outcome: Progressing

## 2021-03-27 NOTE — ED PROVIDER NOTES
History  Chief Complaint   Patient presents with    Abdominal Pain     Pt c/o epigastric pain, states he feels his food isn't going down  Seen at [facility] 3 days ago, no relief with meds  Also c/o SOB on exertion  Patient is a 78-year-old male with a past medical history of hypertension, hyperlipidemia, CHF with an ejection fraction of 20%, status post AICD placement, who presents the ED today for evaluation of epigastric pain  The patient was seen and discharge from the hospital yesterday, where he presented similarly with epigastric pain  He was found to have elevated troponin, that trended down, cardiology evaluated him in the hospital and felt that his elevated troponin was not due to ACS  He was discharged yesterday from the hospital   He states he came in again today because he has ongoing epigastric pain, has been attributed to GERD in the past   He has not been taking his antacid as he was nauseous  He has had nausea and feels as if food isn't going down    He is also complaining of shortness of breath but denies any chest pain, no fevers or chills, no history DVT or PE, no DVT edema, diarrhea, constipation, no melena or hematochezia  His epigastric pain started several days ago, has been worsening, feels the same as it did the other day however it is worse today and was at his previous hospital admission  History provided by:  Patient   used: No        Prior to Admission Medications   Prescriptions Last Dose Informant Patient Reported?  Taking?   aluminum-magnesium hydroxide-simethicone (MYLANTA) 200-200-20 mg/5 mL suspension   No No   Sig: Take 30 mL by mouth every 6 (six) hours as needed for indigestion or heartburn for up to 14 days   aspirin (ECOTRIN LOW STRENGTH) 81 mg EC tablet   No No   Sig: Take 1 tablet (81 mg total) by mouth daily   atorvastatin (LIPITOR) 10 mg tablet   No No   Sig: Take 1 tablet (10 mg total) by mouth daily with dinner   clopidogrel (PLAVIX) 75 mg tablet   No No   Sig: Take 1 tablet (75 mg total) by mouth daily   famotidine (PEPCID) 20 mg tablet   No No   Sig: Take 1 tablet (20 mg total) by mouth daily for 14 days   furosemide (Lasix) 20 mg tablet   No No   Sig: Take 2 tablets (40 mg total) by mouth daily   lisinopril (ZESTRIL) 10 mg tablet   No No   Sig: Take 1 tablet (10 mg total) by mouth daily   metoprolol tartrate (LOPRESSOR) 50 mg tablet   No No   Sig: Take 1 tablet (50 mg total) by mouth every 12 (twelve) hours   potassium chloride (KLOR-CON) 20 mEq packet   No No   Sig: Take 20 mEq by mouth daily      Facility-Administered Medications: None       Past Medical History:   Diagnosis Date    CHF [facility]congestive heart failure) ([facility] [address] )     Hypertension     Pacemaker        History reviewed  No pertinent surgical history  History reviewed  No pertinent family history  I have reviewed and agree with the history as documented  E-Cigarette/Vaping    E-Cigarette Use Never User      E-Cigarette/Vaping Substances     Social History     Tobacco Use    Smoking status: Never Smoker    Smokeless tobacco: Never Used   Substance Use Topics    Alcohol use: Not Currently    Drug use: Yes     Types: Marijuana        Review of Systems   Constitutional: Negative  Negative for chills and fever  Gastrointestinal: Positive for abdominal pain and nausea  Negative for constipation, diarrhea and vomiting  Physical Exam  ED Triage Vitals [03/26/21 1838]   Temperature Pulse Respirations Blood Pressure SpO2   98 3 °F (36 8 °C) (!) 53 18 (!) 143/121 100 %      Temp Source Heart Rate Source Patient Position - Orthostatic VS BP Location FiO2 (%)   Oral Monitor Sitting Left arm --      Pain Score       7             Orthostatic Vital Signs  Vitals:    03/27/21 1100 03/27/21 1938 03/27/21 2250 03/28/21 0307   BP: 114/86 119/91 109/79 99/81   Pulse: 87 84 77 78   Patient Position - Orthostatic VS: Lying          Physical Exam  Vitals signs reviewed     Constitutional: Appearance: He is well-developed  He is not diaphoretic  HENT:      Head: Normocephalic and atraumatic  Right Ear: External ear normal       Left Ear: External ear normal       Nose: Nose normal       Mouth/Throat:      Mouth: Mucous membranes are dry  Eyes:      General: No scleral icterus  Conjunctiva/sclera: Conjunctivae normal    Neck:      Musculoskeletal: Normal range of motion  Vascular: No JVD  Trachea: No tracheal deviation  Cardiovascular:      Rate and Rhythm: Normal rate and regular rhythm  Heart sounds: Normal heart sounds  No murmur  Pulmonary:      Effort: Pulmonary effort is normal  No respiratory distress  Breath sounds: Normal breath sounds  Abdominal:      General: Bowel sounds are normal  There is no distension  Palpations: Abdomen is soft  Tenderness: There is abdominal tenderness  There is no guarding  Comments: Epigastric abdominal tenderness, no right upper quadrant tenderness  Musculoskeletal: Normal range of motion  Skin:     General: Skin is warm and dry  Capillary Refill: Capillary refill takes less than 2 seconds  Neurological:      Mental Status: He is alert and oriented to person, place, and time  Motor: No abnormal muscle tone  Psychiatric:         Mood and Affect: Mood normal          Behavior: Behavior normal          Thought Content:  Thought content normal          Judgment: Judgment normal          ED Medications  Medications   aspirin (ECOTRIN LOW STRENGTH) EC tablet 81 mg (81 mg Oral Given 3/27/21 1040)   atorvastatin (LIPITOR) tablet 10 mg (10 mg Oral Given 3/27/21 1737)   clopidogrel (PLAVIX) tablet 75 mg (75 mg Oral Given 3/27/21 1040)   metoprolol tartrate (LOPRESSOR) tablet 50 mg (50 mg Oral Given 3/27/21 2014)   acetaminophen (TYLENOL) tablet 650 mg (has no administration in time range)   aluminum-magnesium hydroxide-simethicone (MYLANTA) oral suspension 30 mL (has no administration in time range) pantoprazole (PROTONIX) EC tablet 40 mg (40 mg Oral Given 3/27/21 0629)   heparin (porcine) subcutaneous injection 5,000 Units (5,000 Units Subcutaneous Given 3/28/21 0051)   sucralfate (CARAFATE) tablet 1 g (1 g Oral Given 3/26/21 2055)   Lidocaine Viscous HCl (XYLOCAINE) 2 % mucosal solution 15 mL (15 mL Swish & Swallow Given 3/26/21 2056)   aluminum-magnesium hydroxide-simethicone (MYLANTA) oral suspension 30 mL (30 mL Oral Given 3/26/21 2055)   ondansetron (ZOFRAN) injection 4 mg (4 mg Intravenous Given 3/26/21 2211)   pantoprazole (PROTONIX) injection 40 mg (40 mg Intravenous Given 3/26/21 2254)       Diagnostic Studies  Results Reviewed     Procedure Component Value Units Date/Time    Troponin I [[id number]]  (Abnormal) Collected: 03/27/21 0628    Lab Status: Final result Specimen: Blood from Arm, Right Updated: 03/27/21 0703     Troponin I 0 20 ng/mL     Comprehensive metabolic panel [[id number]]  (Abnormal) Collected: 03/27/21 0628    Lab Status: Final result Specimen: Blood from Arm, Right Updated: 03/27/21 0701     Sodium 136 mmol/L      Potassium 4 6 mmol/L      Chloride 106 mmol/L      CO2 23 mmol/L      ANION GAP 7 mmol/L      BUN 21 mg/dL      Creatinine 1 94 mg/dL      Glucose 104 mg/dL      Calcium 8 4 mg/dL      Corrected Calcium 9 6 mg/dL       U/L       U/L      Alkaline Phosphatase 66 U/L      Total Protein 7 0 g/dL      Albumin 2 5 g/dL      Total Bilirubin 1 48 mg/dL      eGFR 46 ml/min/1 73sq m     Narrative:      [place] guidelines for Chronic Kidney Disease (CKD):     Stage 1 with normal or high GFR (GFR > 90 mL/min/1 73 square meters)    Stage 2 Mild CKD (GFR = 60-89 mL/min/1 73 square meters)    Stage 3A Moderate CKD (GFR = 45-59 mL/min/1 73 square meters)    Stage 3B Moderate CKD (GFR = 30-44 mL/min/1 73 square meters)    Stage 4 Severe CKD (GFR = 15-29 mL/min/1 73 square meters)    Stage 5 End Stage CKD (GFR <15 mL/min/1 73 square meters)  Note: GFR calculation is accurate only with a steady state creatinine    CBC (With Platelets) [[id number]]  (Normal) Collected: 03/27/21 0628    Lab Status: Final result Specimen: Blood from Arm, Right Updated: 03/27/21 0652     WBC 5 93 Thousand/uL      RBC 4 55 Million/uL      Hemoglobin 14 4 g/dL      Hematocrit 43 9 %      MCV 97 fL      MCH 31 6 pg      MCHC 32 8 g/dL      RDW 15 0 %      Platelets 688 Thousands/uL      MPV 10 6 fL     Troponin I [[id number]]  (Abnormal) Collected: 03/27/21 0303    Lab Status: Final result Specimen: Blood from Arm, Left Updated: 03/27/21 0355     Troponin I 0 21 ng/mL     Troponin I [[id number]]  (Abnormal) Collected: 03/26/21 2352    Lab Status: Final result Specimen: Blood from Arm, Left Updated: 03/27/21 0017     Troponin I 0 18 ng/mL     Troponin I [[id number]]  (Abnormal) Collected: 03/26/21 2039    Lab Status: Final result Specimen: Blood from Arm, Left Updated: 03/26/21 2119     Troponin I 0 19 ng/mL     Lipase [[id number]]  (Normal) Collected: 03/26/21 2039    Lab Status: Final result Specimen: Blood from Arm, Left Updated: 03/26/21 2118     Lipase 108 u/L     Comprehensive metabolic panel [[id number]]  (Abnormal) Collected: 03/26/21 2039    Lab Status: Final result Specimen: Blood from Arm, Left Updated: 03/26/21 2118     Sodium 137 mmol/L      Potassium 4 4 mmol/L      Chloride 107 mmol/L      CO2 23 mmol/L      ANION GAP 7 mmol/L      BUN 18 mg/dL      Creatinine 1 84 mg/dL      Glucose 127 mg/dL      Calcium 8 4 mg/dL      Corrected Calcium 9 4 mg/dL      AST 90 U/L      ALT 98 U/L      Alkaline Phosphatase 66 U/L      Total Protein 7 4 g/dL      Albumin 2 7 g/dL      Total Bilirubin 1 31 mg/dL      eGFR 49 ml/min/1 73sq m     Narrative:      [place] guidelines for Chronic Kidney Disease (CKD):     Stage 1 with normal or high GFR (GFR > 90 mL/min/1 73 square meters)    Stage 2 Mild CKD (GFR = 60-89 mL/min/1 73 square meters)    Stage 3A Moderate CKD (GFR = 45-59 mL/min/1 73 square meters)    Stage 3B Moderate CKD (GFR = 30-44 mL/min/1 73 square meters)    Stage 4 Severe CKD (GFR = 15-29 mL/min/1 73 square meters)    Stage 5 End Stage CKD (GFR <15 mL/min/1 73 square meters)  Note: GFR calculation is accurate only with a steady state creatinine    CBC and differential [[id number]] Collected: 03/26/21 2039    Lab Status: Final result Specimen: Blood from Arm, Left Updated: 03/26/21 2103     WBC 5 56 Thousand/uL      RBC 4 63 Million/uL      Hemoglobin 14 3 g/dL      Hematocrit 44 5 %      MCV 96 fL      MCH 30 9 pg      MCHC 32 1 g/dL      RDW 15 1 %      MPV 10 5 fL      Platelets 058 Thousands/uL      nRBC 0 /100 WBCs      Neutrophils Relative 65 %      Immat GRANS % 0 %      Lymphocytes Relative 23 %      Monocytes Relative 12 %      Eosinophils Relative 0 %      Basophils Relative 0 %      Neutrophils Absolute 3 55 Thousands/µL      Immature Grans Absolute 0 02 Thousand/uL      Lymphocytes Absolute 1 27 Thousands/µL      Monocytes Absolute 0 69 Thousand/µL      Eosinophils Absolute 0 01 Thousand/µL      Basophils Absolute 0 02 Thousands/µL                  XR chest 2 views   ED Interpretation by Vega Jennings DO (03/26 2214)   No interval change  Final Result by Akira Lovett MD (03/27 1266)      1  Stable cardiomegaly  2   Clear lungs  Workstation performed: [id number]               Procedures  Procedures      ED Course                                       MDM  Number of Diagnoses or Management Options  Elevated troponin I level:   Epigastric pain:   Nausea:   Diagnosis management comments: 53-year-old male presenting with epigastric abdominal pain recently discharged hospital after being found elevated troponin    Epigastric abdominal pain could be secondary to reflux, ACS, less likely cholecystitis as he had a right upper quadrant ultrasound this month that was normal, acute pancreatitis  Cardiac workup reveals elevated troponin more elevated than it was in his previous admission  EKG unremarkable in comparison to prior  Given GI cocktail with no improvement, will give IV Protonix  Discussed Medicine, admit to the hospital for elevated troponin level, unremitting epigastric abdominal pain, nausea       Amount and/or Complexity of Data Reviewed  Clinical lab tests: ordered and reviewed  Tests in the radiology section of CPT®: ordered and reviewed  Review and summarize past medical records: yes  Independent visualization of images, tracings, or specimens: yes        Disposition  Final diagnoses:   Nausea   Elevated troponin I level   Epigastric pain     Time reflects when diagnosis was documented in both MDM as applicable and the Disposition within this note     Time User Action Codes Description Comment    3/26/2021 11:36 PM Je Pacheco [R11 0] Nausea     3/26/2021 11:36 PM Rosa Maria Kenny Add [R77 8] Elevated troponin I level     3/26/2021 11:37 PM Rosa Maria Kenny Add [R10 13] Epigastric pain     3/26/2021 11:37 PM Theadore Kenny Modify [R11 0] Nausea     3/26/2021 11:37 PM Thebaldemar Cordovas Modify [R10 13] Epigastric pain       ED Disposition     ED Disposition Condition Date/Time Comment    Admit Stable Fri Mar 26, 2021 11:36 PM Case was discussed with sod and the patient's admission status was agreed to be Admission Status: observation status to the service of Dr Merlin Zavaleta           Follow-up Information    None         Current Discharge Medication List      CONTINUE these medications which have NOT CHANGED    Details   aluminum-magnesium hydroxide-simethicone (MYLANTA) 200-200-20 mg/5 mL suspension Take 30 mL by mouth every 6 (six) hours as needed for indigestion or heartburn for up to 14 days  Qty: 355 mL, Refills: 0    Associated Diagnoses: Abdominal pain      aspirin (ECOTRIN LOW STRENGTH) 81 mg EC tablet Take 1 tablet (81 mg total) by mouth daily  Qty: 30 tablet, Refills: 1    Associated Diagnoses: Atherosclerotic heart disease of native coronary artery without angina pectoris      atorvastatin (LIPITOR) 10 mg tablet Take 1 tablet (10 mg total) by mouth daily with dinner  Qty: 30 tablet, Refills: 0    Associated Diagnoses: Atherosclerotic heart disease of native coronary artery without angina pectoris      clopidogrel (PLAVIX) 75 mg tablet Take 1 tablet (75 mg total) by mouth daily  Qty: 30 tablet, Refills: 0    Associated Diagnoses: Atherosclerotic heart disease of native coronary artery without angina pectoris      famotidine (PEPCID) 20 mg tablet Take 1 tablet (20 mg total) by mouth daily for 14 days  Qty: 14 tablet, Refills: 0    Associated Diagnoses: Abdominal pain      furosemide (Lasix) 20 mg tablet Take 2 tablets (40 mg total) by mouth daily  Qty: 30 tablet, Refills: 0    Associated Diagnoses: CHF (congestive heart failure) (HCC)      lisinopril (ZESTRIL) 10 mg tablet Take 1 tablet (10 mg total) by mouth daily  Qty: 30 tablet, Refills: 0    Associated Diagnoses: Atherosclerotic heart disease of native coronary artery without angina pectoris      metoprolol tartrate (LOPRESSOR) 50 mg tablet Take 1 tablet (50 mg total) by mouth every 12 (twelve) hours  Qty: 60 tablet, Refills: 0    Associated Diagnoses: Atherosclerotic heart disease of native coronary artery without angina pectoris      potassium chloride (KLOR-CON) 20 mEq packet Take 20 mEq by mouth daily  Qty: 30 packet, Refills: 1    Associated Diagnoses: CHF (congestive heart failure) (HCC)           No discharge procedures on file  PDMP Review     None           ED Provider  Attending physically available and evaluated [facility]  I managed the patient along with the ED Attending      Electronically Signed by         Sena Ramirez DO  03/28/21 9068

## 2021-03-28 PROBLEM — R10.9 ABDOMINAL PAIN: Status: RESOLVED | Noted: 2021-03-24 | Resolved: 2021-03-28

## 2021-03-28 LAB
ANION GAP SERPL CALCULATED.3IONS-SCNC: 7 MMOL/L (ref 4–13)
BACTERIA UR QL AUTO: ABNORMAL /HPF
BASOPHILS # BLD MANUAL: 0 THOUSAND/UL (ref 0–0.1)
BASOPHILS NFR MAR MANUAL: 0 % (ref 0–1)
BILIRUB UR QL STRIP: ABNORMAL
BUN SERPL-MCNC: 27 MG/DL (ref 5–25)
BURR CELLS BLD QL SMEAR: PRESENT
CALCIUM SERPL-MCNC: 7.8 MG/DL (ref 8.3–10.1)
CHLORIDE SERPL-SCNC: 107 MMOL/L (ref 100–108)
CLARITY UR: CLEAR
CO2 SERPL-SCNC: 24 MMOL/L (ref 21–32)
COLOR UR: ABNORMAL
CREAT SERPL-MCNC: 1.97 MG/DL (ref 0.6–1.3)
EOSINOPHIL # BLD MANUAL: 0 THOUSAND/UL (ref 0–0.4)
EOSINOPHIL NFR BLD MANUAL: 0 % (ref 0–6)
ERYTHROCYTE [DISTWIDTH] IN BLOOD BY AUTOMATED COUNT: 15.1 % (ref 11.6–15.1)
GFR SERPL CREATININE-BSD FRML MDRD: 45 ML/MIN/1.73SQ M
GLUCOSE SERPL-MCNC: 84 MG/DL (ref 65–140)
GLUCOSE UR STRIP-MCNC: NEGATIVE MG/DL
HCT VFR BLD AUTO: 44.5 % (ref 36.5–49.3)
HGB BLD-MCNC: 14.6 G/DL (ref 12–17)
HGB UR QL STRIP.AUTO: ABNORMAL
HYALINE CASTS #/AREA URNS LPF: ABNORMAL /LPF
KETONES UR STRIP-MCNC: NEGATIVE MG/DL
LEUKOCYTE ESTERASE UR QL STRIP: NEGATIVE
LYMPHOCYTES # BLD AUTO: 1 THOUSAND/UL (ref 0.6–4.47)
LYMPHOCYTES # BLD AUTO: 18 % (ref 14–44)
MCH RBC QN AUTO: 31.3 PG (ref 26.8–34.3)
MCHC RBC AUTO-ENTMCNC: 32.8 G/DL (ref 31.4–37.4)
MCV RBC AUTO: 95 FL (ref 82–98)
MONOCYTES # BLD AUTO: 0.33 THOUSAND/UL (ref 0–1.22)
MONOCYTES NFR BLD: 6 % (ref 4–12)
NEUTROPHILS # BLD MANUAL: 4.15 THOUSAND/UL (ref 1.85–7.62)
NEUTS SEG NFR BLD AUTO: 75 % (ref 43–75)
NITRITE UR QL STRIP: NEGATIVE
NON-SQ EPI CELLS URNS QL MICRO: ABNORMAL /HPF
NRBC BLD AUTO-RTO: 0 /100 WBCS
PH UR STRIP.AUTO: 6 [PH]
PLATELET # BLD AUTO: 151 THOUSANDS/UL (ref 149–390)
PLATELET BLD QL SMEAR: ADEQUATE
PMV BLD AUTO: 10.3 FL (ref 8.9–12.7)
POIKILOCYTOSIS BLD QL SMEAR: PRESENT
POTASSIUM SERPL-SCNC: 4.6 MMOL/L (ref 3.5–5.3)
PROT UR STRIP-MCNC: ABNORMAL MG/DL
RBC # BLD AUTO: 4.67 MILLION/UL (ref 3.88–5.62)
RBC #/AREA URNS AUTO: ABNORMAL /HPF
RBC MORPH BLD: PRESENT
SODIUM SERPL-SCNC: 138 MMOL/L (ref 136–145)
SP GR UR STRIP.AUTO: 1.03 (ref 1–1.03)
TOTAL CELLS COUNTED SPEC: 100
UROBILINOGEN UR QL STRIP.AUTO: 1 E.U./DL
VARIANT LYMPHS # BLD AUTO: 1 %
WBC # BLD AUTO: 5.53 THOUSAND/UL (ref 4.31–10.16)
WBC #/AREA URNS AUTO: ABNORMAL /HPF

## 2021-03-28 PROCEDURE — 99255 IP/OBS CONSLTJ NEW/EST HI 80: CPT | Performed by: INTERNAL MEDICINE

## 2021-03-28 PROCEDURE — 85027 COMPLETE CBC AUTOMATED: CPT | Performed by: INTERNAL MEDICINE

## 2021-03-28 PROCEDURE — 82553 CREATINE MB FRACTION: CPT | Performed by: STUDENT IN AN ORGANIZED HEALTH CARE EDUCATION/TRAINING PROGRAM

## 2021-03-28 PROCEDURE — 80048 BASIC METABOLIC PNL TOTAL CA: CPT | Performed by: INTERNAL MEDICINE

## 2021-03-28 PROCEDURE — 82550 ASSAY OF CK (CPK): CPT | Performed by: STUDENT IN AN ORGANIZED HEALTH CARE EDUCATION/TRAINING PROGRAM

## 2021-03-28 PROCEDURE — 85007 BL SMEAR W/DIFF WBC COUNT: CPT | Performed by: INTERNAL MEDICINE

## 2021-03-28 PROCEDURE — 81001 URINALYSIS AUTO W/SCOPE: CPT | Performed by: INTERNAL MEDICINE

## 2021-03-28 PROCEDURE — 99232 SBSQ HOSP IP/OBS MODERATE 35: CPT | Performed by: INTERNAL MEDICINE

## 2021-03-28 RX ADMIN — ATORVASTATIN CALCIUM 10 MG: 10 TABLET, FILM COATED ORAL at 16:41

## 2021-03-28 RX ADMIN — HEPARIN SODIUM 5000 UNITS: 5000 INJECTION INTRAVENOUS; SUBCUTANEOUS at 16:41

## 2021-03-28 RX ADMIN — PANTOPRAZOLE SODIUM 40 MG: 40 TABLET, DELAYED RELEASE ORAL at 05:12

## 2021-03-28 RX ADMIN — HEPARIN SODIUM 5000 UNITS: 5000 INJECTION INTRAVENOUS; SUBCUTANEOUS at 08:58

## 2021-03-28 RX ADMIN — ASPIRIN 81 MG: 81 TABLET, COATED ORAL at 08:58

## 2021-03-28 RX ADMIN — METOPROLOL TARTRATE 50 MG: 50 TABLET, FILM COATED ORAL at 20:59

## 2021-03-28 RX ADMIN — METOPROLOL TARTRATE 50 MG: 50 TABLET, FILM COATED ORAL at 08:58

## 2021-03-28 RX ADMIN — HEPARIN SODIUM 5000 UNITS: 5000 INJECTION INTRAVENOUS; SUBCUTANEOUS at 00:51

## 2021-03-28 RX ADMIN — CLOPIDOGREL BISULFATE 75 MG: 75 TABLET, FILM COATED ORAL at 08:58

## 2021-03-28 NOTE — PLAN OF CARE
Problem: GASTROINTESTINAL - ADULT  Goal: Maintains or returns to baseline bowel function  Description: INTERVENTIONS:  - Assess bowel function  - Encourage oral fluids to ensure adequate hydration  - Administer IV fluids if ordered to ensure adequate hydration  - Administer ordered medications as needed  - Encourage mobilization and activity  - Consider nutritional services referral to assist patient with adequate nutrition and appropriate food choices  Outcome: Progressing  Goal: Maintains adequate nutritional intake  Description: INTERVENTIONS:  - Monitor percentage of each meal consumed  - Identify factors contributing to decreased intake, treat as appropriate  - Assist with meals as needed  - Monitor I&O, weight, and lab values if indicated  - Obtain nutrition services referral as needed  Outcome: Progressing     Problem: GENITOURINARY - ADULT  Goal: Maintains or returns to baseline urinary function  Description: INTERVENTIONS:  - Assess urinary function  - Encourage oral fluids to ensure adequate hydration if ordered  - Administer IV fluids as ordered to ensure adequate hydration  - Administer ordered medications as needed  - Offer frequent toileting  - Follow urinary retention protocol if ordered  Outcome: Progressing     Problem: METABOLIC, FLUID AND ELECTROLYTES - ADULT  Goal: Electrolytes maintained within normal limits  Description: INTERVENTIONS:  - Monitor labs and assess patient for signs and symptoms of electrolyte imbalances  - Administer electrolyte replacement as ordered  - Monitor response to electrolyte replacements, including repeat lab results as appropriate  - Instruct patient on fluid and nutrition as appropriate  Outcome: Progressing     Problem: SKIN/TISSUE INTEGRITY - ADULT  Goal: Skin integrity remains intact  Description: INTERVENTIONS  - Identify patients at risk for skin breakdown  - Assess and monitor skin integrity  - Assess and monitor nutrition and hydration status  - Monitor labs (i e  albumin)  - Assess for incontinence   - Turn and reposition patient  - Assist with mobility/ambulation  - Relieve pressure over bony prominences  - Avoid friction and shearing  - Provide appropriate hygiene as needed including keeping skin clean and dry  - Evaluate need for skin moisturizer/barrier cream  - Collaborate with interdisciplinary team (i e  Nutrition, Rehabilitation, etc )   - Patient/family teaching  Outcome: Progressing     Problem: PAIN - ADULT  Goal: Verbalizes/displays adequate comfort level or baseline comfort level  Description: Interventions:  - Encourage patient to monitor pain and request assistance  - Assess pain using appropriate pain scale  - Administer analgesics based on type and severity of pain and evaluate response  - Implement non-pharmacological measures as appropriate and evaluate response  - Consider cultural and social influences on pain and pain management  - Notify physician/advanced practitioner if interventions unsuccessful or patient reports new pain  Outcome: Progressing     Problem: SAFETY ADULT  Goal: Patient will remain free of falls  Description: INTERVENTIONS:  - Assess patient frequently for physical needs  -  Identify cognitive and physical deficits and behaviors that affect risk of falls    -  [place] fall precautions as indicated by assessment   - Educate patient/family on patient safety including physical limitations  - Instruct patient to call for assistance with activity based on assessment  - Modify environment to reduce risk of injury  - Consider OT/PT consult to assist with strengthening/mobility  Outcome: Progressing

## 2021-03-28 NOTE — PROGRESS NOTES
INTERNAL MEDICINE RESIDENCY PROGRESS NOTE     Name: Susan Levine   Age & Sex: 48 y o  male   MRN: [id number]  Unit/Bed#: [address] 65-5   Encounter: [id number]  Team: SOD Team B     PATIENT INFORMATION     Name: Susan Levine   Age & Sex: 48 y o  male   MRN: [place] Stay Days: 0    ASSESSMENT/PLAN     Principal Problem:    DESHAWN (acute kidney injury) ([facility] )  Active Problems:    Atherosclerotic heart disease of native coronary artery without angina pectoris    Chronic systolic congestive heart failure ([facility] )    Essential (primary) hypertension    Elevated troponin I level      Elevated troponin I level  Assessment & Plan  Chronic elevation in troponin btwn 0 12-0  19  Likely non MI related with stable EKG on admission  Patient was evaluated by Cardiology during recent admission at [place] and they also thought troponin elevation was not on MI related  -Monitor telemetry    Essential (primary) hypertension  Assessment & Plan  /111 in the ED was likely erroneous reading, repeat SBP stable at 140s    -holding lisinopril 10mg in the setting of DESHANW  -continue with home medication metoprolol tartrate 50mg    Chronic systolic congestive heart failure (HCC)  Assessment & Plan  Wt Readings from Last 3 Encounters:   03/26/21 111 kg (245 lb)   03/24/21 115 kg (253 lb 8 5 oz)   10/13/20 104 kg (230 lb)        Echocardiogram from 05/6717 showed systolic function was severely reduced with ejection fraction estimated to be 20 % with severe diffuse hypokinesis    Has single chamber AICD in place  Seen by cardiology during recent admission at [place]   Patient appears euvolemic, denies chest pain, shortness of breath, palpitation, headaches, lightheadedness    -Continue metoprolol 50mg BID, plavix, aspirin and statin  -Lasix and lisinopril on hold due to DESHAWN  -Will need to establish outpatient follow up with cardiology as pt is new to the area    * DESHAWN (acute kidney injury) [facility])  Assessment & Plan  Presented with Cr of 1 84 on admission (baseline likely 1 1-1 2) in the setting of poor PO intake due to his abdominal pain  Creatinine 1 97 this morning, worsening kidney function in the setting of CHF with low EF of 20%  Likely cardiorenal syndrome  He is s/p gentle hydration will with IV fluids with no improvement in kidney function  He appears euvolemic   - nephrology consulted recommendation appreciated  - Will continue to hold IV Lasix- Continue to monitor kidney function  -  home medications: lasix 40mg and lisinopril 10mg were held due to DESHAWN  - follow-up results of urine creatinine urine sodium and UA  -trend Cr/follow-up BMP in a m  Abdominal pain-resolved as of 3/28/2021  Assessment & Plan  Constant epigastric/RUQ abdominal pain that intermittently worsens after eating, laying flat on his back and when he is walking  Likely peptic ulcer disease vs gastritis  Recent RUQ US showed hepatic and R renal cysts, but no signs of cholecystitis  -received mylanta, lidocaine viscous, carafrate and protonix in the ED  -mylanta PRN  -continue protonix 40mg daily  - recommend outpatient colonoscopy for colon cancer screening  -patient now on regular diet      Disposition:  Continue in patient management     SUBJECTIVE     Patient seen and examined by bedside, states his abdominal pain is resolved  Ten point review of system unremarkable  No acute events overnight  OBJECTIVE     Vitals:    21 0852 21 0900 21 0930 21 1000   BP: (!) 134/101 (!) 134/101     BP Location:       Pulse: 90 85 85 79   Resp:       Temp:       TempSrc:       SpO2: 91% 96% (!) 75% 95%   Weight:       Height:          Temperature:   Temp (24hrs), Av 1 °F (36 7 °C), Min:97 5 °F (36 4 °C), Max:99 °F (37 2 °C)    Temperature: 99 °F (37 2 °C)  Intake & Output:  I/O        07 -  07 07 -  07 07 -  0700    P  O   965     Total Intake(mL/kg)  965 (8 7)     Net  +965                Weights: IBW: 70 7 kg    Body mass index is 36 18 kg/m²  Weight (last 2 days)     Date/Time   Weight    03/26/21 1832   111 (245)            Physical Exam  Vitals signs and nursing note reviewed  Constitutional:       Appearance: He is well-developed  HENT:      Head: Normocephalic and atraumatic  Eyes:      Conjunctiva/sclera: Conjunctivae normal    Neck:      Musculoskeletal: Neck supple  Cardiovascular:      Rate and Rhythm: Normal rate and regular rhythm  Heart sounds: No murmur  Pulmonary:      Effort: Pulmonary effort is normal  No respiratory distress  Breath sounds: Normal breath sounds  Abdominal:      Palpations: Abdomen is soft  Tenderness: There is no abdominal tenderness  Skin:     General: Skin is warm and dry  Neurological:      Mental Status: He is alert  LABORATORY DATA     Labs: I have personally reviewed pertinent reports    Results from last 7 days   Lab Units 03/28/21  0852 03/27/21 0628 03/26/21 2039 03/24/21  0208   WBC Thousand/uL 5 53 5 93 5 56 5 05   HEMOGLOBIN g/dL 14 6 14 4 14 3 13 7   HEMATOCRIT % 44 5 43 9 44 5 42 4   PLATELETS Thousands/uL 151 164 176 189   NEUTROS PCT %  --   --  65 55   MONOS PCT %  --   --  12 17*   MONO PCT % 6  --   --   --       Results from last 7 days   Lab Units 03/28/21  0852 03/27/21 0628 03/26/21 2039 03/24/21  0208   POTASSIUM mmol/L 4 6 4 6 4 4 3 5   CHLORIDE mmol/L 107 106 107 103   CO2 mmol/L 24 23 23 29   BUN mg/dL 27* 21 18 14   CREATININE mg/dL 1 97* 1 94* 1 84* 1 52*   CALCIUM mg/dL 7 8* 8 4 8 4 8 6   ALK PHOS U/L  --  66 66 53 1   ALT U/L  --  107* 98* 30   AST U/L  --  107* 90* 25     Results from last 7 days   Lab Units 03/24/21  0208   MAGNESIUM mg/dL 1 9              Results from last 7 days   Lab Units 03/24/21  0215   LACTIC ACID mmol/L 1 7     Results from last 7 days   Lab Units 03/27/21  0628 03/27/21  0303 03/26/21  2352   TROPONIN I ng/mL 0 20* 0 21* 0 18*       IMAGING & DIAGNOSTIC TESTING     Radiology Results: I have personally reviewed pertinent reports  Xr Chest 2 Views    Result Date: 3/27/2021  Impression: 1  Stable cardiomegaly  2   Clear lungs  Workstation performed: [id number]     Other Diagnostic Testing: I have personally reviewed pertinent reports  ACTIVE MEDICATIONS     Current Facility-Administered Medications   Medication Dose Route Frequency    acetaminophen (TYLENOL) tablet 650 mg  650 mg Oral Q6H PRN    aluminum-magnesium hydroxide-simethicone (MYLANTA) oral suspension 30 mL  30 mL Oral Q6H PRN    aspirin (ECOTRIN LOW STRENGTH) EC tablet 81 mg  81 mg Oral Daily    atorvastatin (LIPITOR) tablet 10 mg  10 mg Oral Daily With Dinner    clopidogrel (PLAVIX) tablet 75 mg  75 mg Oral Daily    heparin (porcine) subcutaneous injection 5,000 Units  5,000 Units Subcutaneous Q8H [facility]    metoprolol tartrate (LOPRESSOR) tablet 50 mg  50 mg Oral Q12H KIKO    pantoprazole (PROTONIX) EC tablet 40 mg  40 mg Oral Early Morning       VTE Pharmacologic Prophylaxis: Heparin  VTE Mechanical Prophylaxis: sequential compression device    Portions of the record may have been created with voice recognition software  Occasional wrong word or "sound a like" substitutions may have occurred due to the inherent limitations of voice recognition software    Read the chart carefully and recognize, using context, where substitutions have occurred   ==  Gregg Mancera, [address] [facility]  Internal Medicine Residency PGY-2

## 2021-03-28 NOTE — CONSULTS
Consultation - Nephrology   [facility] 48 y o  male MRN: [id number]  Unit/Bed#: [facility] 622-01 Encounter: [id number]    ASSESSMENT and PLAN:  1  Acute kidney injury (POA) as per review of previous labs including Care everywhere noted his creatinine is been fluctuating around 1 1 up to 1 4 since 2016  Noted creatinine was 1 52 when patient with recently to [facility] ER  Creatinine on admission 1 84, patient received intravenously fluids, diuretics and Ace inhibitors on hold  Agree with urinalysis with urine sodium  Keep holding diuretics for now  Encourage oral intake  Avoid relative hypotension as well as blood pressure changes, noted his blood pressure variability is pronounced with some systolic blood pressure readings over 200 and then down to 99 overnight  Keep systolic blood pressures over 130 for now  Follow daily labs  Monitor ins and outs  2  Chronic systolic congestive heart failure, previous echo showed an EF of 20%, status post AICD  Hold diuretics for today  Keep holding Ace inhibitors as above  Needs cardiology follow-up    3  Abdominal pain on admission, seems to be improving  Avoid magnesium-containing products in the setting of acute renal failure    4  Hypertension, hold lisinopril the setting of DESHAWN  Avoid relative hypotension or significant hemodynamic changes    5  Morbid obesity, per primary team      SUMMARY OF RECOMMENDATIONS:  Check urinalysis, check urine sodium  Keep holding Ace inhibitors for now  Keep holding diuretics for and least another 24 hours  No need for more intravenously fluids  Follow low-salt diet  Avoid relative hypotension, keep systolic blood pressure over 130  Follow daily labs    My plan and recommendations were discussed with Internal Medicine team        HISTORY OF PRESENT ILLNESS:  Requesting Physician: Go Cruz MD  Reason for Consult:  Acute renal failure    [facility] is a 48 y o  male who was admitted to [facility] after presenting with abdominal pain  A renal consultation is requested today for assistance in the management of acute renal failure  Patient with known history of chronic systolic congestive heart failure, hypertension, morbid obesity, who presents to the hospital with epigastric abdominal pain for 2-3 weeks duration, patient was found to be in acute renal failure, Ace inhibitors and diuretics were on hold, he received intravenously fluid, creatinine remains elevated for that reason Nephrology was consulted  During my evaluation patient in general is feeling better, continue with some epigastric pain on and off but seems to be improving, denies any chest pain, no shortness of Breath, no nausea, no vomiting, no diarrhea or constipation  Denies any urinary problems other than urine output is decreasing he is off diuretics  Denies NSAID use  Henrry Sales PAST MEDICAL HISTORY:  Past Medical History:   Diagnosis Date    CHF [facility]congestive heart failure) ([facility] )     Hypertension     Pacemaker        PAST SURGICAL HISTORY:  History reviewed  No pertinent surgical history  SOCIAL HISTORY:  Social History     Substance and Sexual Activity   Alcohol Use Not Currently     Social History     Substance and Sexual Activity   Drug Use Yes    Types: Marijuana     Social History     Tobacco Use   Smoking Status Never Smoker   Smokeless Tobacco Never Used       FAMILY HISTORY:  History reviewed  No pertinent family history      ALLERGIES:  No Known Allergies    MEDICATIONS:    Current Facility-Administered Medications:     acetaminophen (TYLENOL) tablet 650 mg, 650 mg, Oral, Q6H PRN, Otilia Baltazarens, DO    aluminum-magnesium hydroxide-simethicone (MYLANTA) oral suspension 30 mL, 30 mL, Oral, Q6H PRN, Otilia Baltazarens, DO    aspirin (ECOTRIN LOW STRENGTH) EC tablet 81 mg, 81 mg, Oral, Daily, Otilia Spears, , 81 mg at 03/28/21 0858    atorvastatin (LIPITOR) tablet 10 mg, 10 mg, Oral, Daily With Tahir Erickson Cianciolo, DO, 10 mg at 03/27/21 1737    clopidogrel (PLAVIX) tablet 75 mg, 75 mg, Oral, Daily, Amador Meals, DO, 75 mg at 03/28/21 0858    heparin (porcine) subcutaneous injection 5,000 Units, 5,000 Units, Subcutaneous, Q8H [address], 5,000 Units at 03/28/21 0858 **AND** [CANCELED] Platelet count, , , Once, Triage Protocol Emergency, MD    metoprolol tartrate (LOPRESSOR) tablet 50 mg, 50 mg, Oral, Q12H [address], Amador Meals, DO, 50 mg at 03/28/21 0858    pantoprazole (PROTONIX) EC tablet 40 mg, 40 mg, Oral, Early Morning, Max Tamayo MD, 40 mg at 03/28/21 0842    REVIEW OF SYSTEMS:  All the systems were reviewed and were negative except as documented on the HPI      PHYSICAL EXAM:  Current Weight: Weight - Scale: 111 kg (245 lb)  First Weight: Weight - Scale: 111 kg (245 lb)  Vitals:    03/28/21 0852 03/28/21 0900 03/28/21 0930 03/28/21 1000   BP: (!) 134/101 (!) 134/101     BP Location:       Pulse: 90 85 85 79   Resp:       Temp:       TempSrc:       SpO2: 91% 96% (!) 75% 95%   Weight:       Height:           Intake/Output Summary (Last 24 hours) at 3/28/2021 1311  Last data filed at 3/27/2021 1900  Gross per 24 hour   Intake 240 ml   Output --   Net 240 ml     General:  Morbid obese, cooperative, in not acute distress  Eyes: conjunctivae pink, anicteric sclerae  ENT: lips and mucous membranes moist  Neck: supple, no JVD  Chest: clear breath sounds bilateral, no crackles, ronchus or wheezings  CVS: distinct S1 & S2, normal rate, regular rhythm  Abdomen: soft, non-tender, non-distended, normoactive bowel sounds  Extremities: no significant edema of both legs  Skin: no rash  Neuro: awake, alert, oriented      Invasive Devices:        Lab Results:   Results from last 7 days   Lab Units 03/28/21  0852 03/27/21  0628 03/26/21 2039 03/24/21  0208   WBC Thousand/uL 5 53 5 93 5 56 5 05   HEMOGLOBIN g/dL 14 6 14 4 14 3 13 7   HEMATOCRIT % 44 5 43 9 44 5 42 4   PLATELETS Thousands/uL 151 164 176 189   SODIUM mmol/L 138 136 137 140   POTASSIUM mmol/L 4 6 4 6 4 4 3 5   CHLORIDE mmol/L 107 106 107 103   CO2 mmol/L 24 23 23 29   BUN mg/dL 27* 21 18 14   CREATININE mg/dL 1 97* 1 94* 1 84* 1 52*   CALCIUM mg/dL 7 8* 8 4 8 4 8 6   MAGNESIUM mg/dL  --   --   --  1 9   ALK PHOS U/L  --  66 66 53 1   ALT U/L  --  107* 98* 30   AST U/L  --  107* 90* 25       Other Studies:   Chest x-ray on 03/26, to my evaluation chest x-ray shows clear lungs, cardiomegaly      Portions of the record may have been created with voice recognition software  Occasional wrong word or "sound a like" substitutions may have occurred due to the inherent limitations of voice recognition software  Read the chart carefully and recognize, using context, where substitutions have occurred  If you have any questions, please contact the dictating provider

## 2021-03-29 LAB
ALBUMIN SERPL BCP-MCNC: 2.1 G/DL (ref 3.5–5)
ALP SERPL-CCNC: 70 U/L (ref 46–116)
ALT SERPL W P-5'-P-CCNC: 147 U/L (ref 12–78)
ANION GAP SERPL CALCULATED.3IONS-SCNC: 7 MMOL/L (ref 4–13)
AST SERPL W P-5'-P-CCNC: 115 U/L (ref 5–45)
BILIRUB SERPL-MCNC: 0.78 MG/DL (ref 0.2–1)
BUN SERPL-MCNC: 25 MG/DL (ref 5–25)
CALCIUM ALBUM COR SERPL-MCNC: 9.6 MG/DL (ref 8.3–10.1)
CALCIUM SERPL-MCNC: 8.1 MG/DL (ref 8.3–10.1)
CHLORIDE SERPL-SCNC: 108 MMOL/L (ref 100–108)
CK MB SERPL-MCNC: 1.1 NG/ML (ref 0–5)
CK MB SERPL-MCNC: <1 % (ref 0–2.5)
CK SERPL-CCNC: 181 U/L (ref 39–308)
CO2 SERPL-SCNC: 23 MMOL/L (ref 21–32)
CREAT SERPL-MCNC: 1.79 MG/DL (ref 0.6–1.3)
CREAT UR-MCNC: 175 MG/DL
CREAT UR-MCNC: 176 MG/DL
ERYTHROCYTE [DISTWIDTH] IN BLOOD BY AUTOMATED COUNT: 14.9 % (ref 11.6–15.1)
GFR SERPL CREATININE-BSD FRML MDRD: 51 ML/MIN/1.73SQ M
GLUCOSE SERPL-MCNC: 139 MG/DL (ref 65–140)
HCT VFR BLD AUTO: 43.8 % (ref 36.5–49.3)
HGB BLD-MCNC: 14.3 G/DL (ref 12–17)
MCH RBC QN AUTO: 31.3 PG (ref 26.8–34.3)
MCHC RBC AUTO-ENTMCNC: 32.6 G/DL (ref 31.4–37.4)
MCV RBC AUTO: 96 FL (ref 82–98)
NRBC BLD AUTO-RTO: 0 /100 WBCS
PLATELET # BLD AUTO: 162 THOUSANDS/UL (ref 149–390)
PMV BLD AUTO: 11 FL (ref 8.9–12.7)
POTASSIUM SERPL-SCNC: 4 MMOL/L (ref 3.5–5.3)
PROT SERPL-MCNC: 6.6 G/DL (ref 6.4–8.2)
PROT UR-MCNC: 1018 MG/DL
PROT/CREAT UR: 5.82 MG/G{CREAT} (ref 0–0.1)
RBC # BLD AUTO: 4.57 MILLION/UL (ref 3.88–5.62)
SODIUM 24H UR-SCNC: 21 MOL/L
SODIUM SERPL-SCNC: 138 MMOL/L (ref 136–145)
WBC # BLD AUTO: 4.74 THOUSAND/UL (ref 4.31–10.16)

## 2021-03-29 PROCEDURE — 82570 ASSAY OF URINE CREATININE: CPT | Performed by: NURSE PRACTITIONER

## 2021-03-29 PROCEDURE — 85027 COMPLETE CBC AUTOMATED: CPT | Performed by: STUDENT IN AN ORGANIZED HEALTH CARE EDUCATION/TRAINING PROGRAM

## 2021-03-29 PROCEDURE — 84156 ASSAY OF PROTEIN URINE: CPT | Performed by: NURSE PRACTITIONER

## 2021-03-29 PROCEDURE — 99232 SBSQ HOSP IP/OBS MODERATE 35: CPT | Performed by: INTERNAL MEDICINE

## 2021-03-29 PROCEDURE — 82570 ASSAY OF URINE CREATININE: CPT | Performed by: INTERNAL MEDICINE

## 2021-03-29 PROCEDURE — 80053 COMPREHEN METABOLIC PANEL: CPT | Performed by: STUDENT IN AN ORGANIZED HEALTH CARE EDUCATION/TRAINING PROGRAM

## 2021-03-29 PROCEDURE — 84300 ASSAY OF URINE SODIUM: CPT | Performed by: INTERNAL MEDICINE

## 2021-03-29 RX ORDER — ALBUMIN (HUMAN) 12.5 G/50ML
25 SOLUTION INTRAVENOUS ONCE
Status: COMPLETED | OUTPATIENT
Start: 2021-03-29 | End: 2021-03-29

## 2021-03-29 RX ADMIN — ALBUMIN (HUMAN) 25 G: 0.25 INJECTION, SOLUTION INTRAVENOUS at 15:17

## 2021-03-29 RX ADMIN — CLOPIDOGREL BISULFATE 75 MG: 75 TABLET, FILM COATED ORAL at 09:50

## 2021-03-29 RX ADMIN — HEPARIN SODIUM 5000 UNITS: 5000 INJECTION INTRAVENOUS; SUBCUTANEOUS at 16:58

## 2021-03-29 RX ADMIN — ASPIRIN 81 MG: 81 TABLET, COATED ORAL at 09:50

## 2021-03-29 RX ADMIN — HEPARIN SODIUM 5000 UNITS: 5000 INJECTION INTRAVENOUS; SUBCUTANEOUS at 01:31

## 2021-03-29 RX ADMIN — METOPROLOL TARTRATE 50 MG: 50 TABLET, FILM COATED ORAL at 09:51

## 2021-03-29 RX ADMIN — ATORVASTATIN CALCIUM 10 MG: 10 TABLET, FILM COATED ORAL at 16:58

## 2021-03-29 RX ADMIN — HEPARIN SODIUM 5000 UNITS: 5000 INJECTION INTRAVENOUS; SUBCUTANEOUS at 09:51

## 2021-03-29 RX ADMIN — PANTOPRAZOLE SODIUM 40 MG: 40 TABLET, DELAYED RELEASE ORAL at 05:38

## 2021-03-29 NOTE — PROGRESS NOTES
INTERNAL MEDICINE RESIDENCY PROGRESS NOTE     Name: Shruti Boateng   Age & Sex: 48 y o  male   MRN: [id number]  [facility] [address]   Encounter: [id number]  Team: SOD Team B     PATIENT INFORMATION     Name: Shruti Boateng   Age & Sex: 48 y o  male   MRN: [place] Stay Days: 1    ASSESSMENT/PLAN     Principal Problem:    DESHAWN (acute kidney injury) ([facility] 75 )  Active Problems:    Atherosclerotic heart disease of native coronary artery without angina pectoris    Chronic systolic congestive heart failure ([facility] )    Essential (primary) hypertension    Elevated troponin I level      Elevated troponin I level  Assessment & Plan  Chronic elevation in troponin btwn 0 12-0  19  Likely non MI related with stable EKG on admission  Patient was evaluated by Cardiology during recent admission at [facility] and they also thought troponin elevation was not on MI related  -Monitor telemetry    Essential (primary) hypertension  Assessment & Plan  · Home medications include:  Furosemide 40 mg daily, lisinopril 10 mg daily, Lopressor 50 mg every 12 hours  · Current medications include:  Lopressor 50 mg every 12 hours  · Maximize hemodynamics to maintain MAP >65  · Avoid hypotension or fluctuations in blood pressure  · Will continue to trend  · holding lisinopril 10mg in the setting of DESHAWN  · Continue with home medication metoprolol tartrate 50mg    Chronic systolic congestive heart failure (HCC)  Assessment & Plan  Wt Readings from Last 3 Encounters:   03/26/21 111 kg (245 lb)   03/24/21 115 kg (253 lb 8 5 oz)   10/13/20 104 kg (230 lb)        Echocardiogram from 81/9412 showed systolic function was severely reduced with ejection fraction estimated to be 20 % with severe diffuse hypokinesis    Has single chamber AICD in place  Seen by cardiology during recent admission at [facility]   Patient appears euvolemic, denies chest pain, shortness of breath, palpitation, headaches, lightheadedness    -Continue metoprolol 50mg BID, plavix, aspirin and statin  -Lasix and lisinopril on hold due to DESHAWN  - continue I&O, weight measurement, cardiac diet 2 g sodium  -Will need to establish outpatient follow up with cardiology as pt is new to the area    * DESHAWN (acute kidney injury) [facility])  Assessment & Plan  Presented with Cr of 1 84 on admission (baseline likely 1 1-1 4) in the setting of poor PO intake due to his abdominal pain  Most recent Creatinine 1 97  worsening kidney function in the setting of CHF with low EF of 20%  Likely pre renal,  FENA 0 2%  He is s/p gentle hydration will with IV fluids with no improvement in kidney function  He appears euvolemic   - Urinalysis with micro 3/28/21 reports:  SG 1 029, large blood,+4 proteinuria, hyaline casts 0-3  Urine sodium-21, Right upper quadrant reveals right kidney measuring 11 1 x 4 7 x 5 5 cm with mid pole right kidney cyst measuring 1 2 x 0 8 x 0 9 cm   Plan  - nephrology consulted recommendation appreciated  - AM labs pending as patient refused labs this morning  Will follow up to see if there is improvement  - Will continue to hold IV Lasix  -  home medications: lasix 40mg and lisinopril 10mg were held due to DESHAWN  -trend Cr/follow-up BMP in a m  Abdominal pain-resolved as of 3/28/2021  Assessment & Plan  Constant epigastric/RUQ abdominal pain that intermittently worsens after eating, laying flat on his back and when he is walking  Likely peptic ulcer disease vs gastritis  Recent RUQ US showed hepatic and R renal cysts, but no signs of cholecystitis  -received mylanta, lidocaine viscous, carafrate and protonix in the ED  -mylanta PRN  -continue protonix 40mg daily  - recommend outpatient colonoscopy for colon cancer screening  -patient now on regular diet      Disposition:  Continue inpatient management patient still has DESHAWN    SUBJECTIVE     Patient seen and examined by bedside, he looked comfortable not in any obvious distress  Twelve point review of system remarkable   No acute events overnight  OBJECTIVE     Vitals:    21 0204 21 0854   BP: 132/96 129/96 119/95 140/99   Pulse: 90 76 79 62   Resp:  20 22 16   Temp:  97 6 °F (36 4 °C) 98 4 °F (36 9 °C)    TempSrc:       SpO2: 94% 95% 91% 95%   Weight:       Height:          Temperature:   Temp (24hrs), Av 3 °F (36 8 °C), Min:97 6 °F (36 4 °C), Max:98 9 °F (37 2 °C)    Temperature: 98 4 °F (36 9 °C)  Intake & Output:  I/O        07 -  0700  07 -  07 07 -  0700    P  O  965 705     Total Intake(mL/kg) 965 (8 7) 705 (6 4)     Net +965 +705            Unmeasured Urine Occurrence  4 x         Weights:   IBW: 70 7 kg    Body mass index is 36 18 kg/m²  Weight (last 2 days)     None        Physical Exam  Vitals signs and nursing note reviewed  Constitutional:       Appearance: He is well-developed  He is obese  HENT:      Head: Normocephalic and atraumatic  Eyes:      Conjunctiva/sclera: Conjunctivae normal    Neck:      Musculoskeletal: Neck supple  Cardiovascular:      Rate and Rhythm: Normal rate and regular rhythm  Heart sounds: No murmur  Pulmonary:      Effort: Pulmonary effort is normal  No respiratory distress  Breath sounds: Normal breath sounds  Abdominal:      Palpations: Abdomen is soft  Tenderness: There is no abdominal tenderness  Musculoskeletal:      Comments: Bilateral gynecomastia noted   Skin:     General: Skin is warm and dry  Capillary Refill: Capillary refill takes less than 2 seconds  Neurological:      General: No focal deficit present  Mental Status: He is alert and oriented to person, place, and time  Psychiatric:         Mood and Affect: Mood normal          Behavior: Behavior normal        LABORATORY DATA     Labs: I have personally reviewed pertinent reports    Results from last 7 days   Lab Units 21  0852 21  0208   WBC Thousand/uL 5 53 5 93 5 56 5 05 HEMOGLOBIN g/dL 14 6 14 4 14 3 13 7   HEMATOCRIT % 44 5 43 9 44 5 42 4   PLATELETS Thousands/uL 151 164 176 189   NEUTROS PCT %  --   --  65 55   MONOS PCT %  --   --  12 17*   MONO PCT % 6  --   --   --       Results from last 7 days   Lab Units 03/28/21  0852 03/27/21  0628 03/26/21  2039 03/24/21  0208   POTASSIUM mmol/L 4 6 4 6 4 4 3 5   CHLORIDE mmol/L 107 106 107 103   CO2 mmol/L 24 23 23 29   BUN mg/dL 27* 21 18 14   CREATININE mg/dL 1 97* 1 94* 1 84* 1 52*   CALCIUM mg/dL 7 8* 8 4 8 4 8 6   ALK PHOS U/L  --  66 66 53 1   ALT U/L  --  107* 98* 30   AST U/L  --  107* 90* 25     Results from last 7 days   Lab Units 03/24/21  0208   MAGNESIUM mg/dL 1 9              Results from last 7 days   Lab Units 03/24/21  0215   LACTIC ACID mmol/L 1 7     Results from last 7 days   Lab Units 03/27/21  0628 03/27/21  0303 03/26/21  2352   TROPONIN I ng/mL 0 20* 0 21* 0 18*       IMAGING & DIAGNOSTIC TESTING     Radiology Results: I have personally reviewed pertinent reports  Xr Chest 2 Views    Result Date: 3/27/2021  Impression: 1  Stable cardiomegaly  2   Clear lungs  Workstation performed: [id number]     Other Diagnostic Testing: I have personally reviewed pertinent reports      ACTIVE MEDICATIONS     Current Facility-Administered Medications   Medication Dose Route Frequency    acetaminophen (TYLENOL) tablet 650 mg  650 mg Oral Q6H PRN    aspirin (ECOTRIN LOW STRENGTH) EC tablet 81 mg  81 mg Oral Daily    atorvastatin (LIPITOR) tablet 10 mg  10 mg Oral Daily With Dinner    clopidogrel (PLAVIX) tablet 75 mg  75 mg Oral Daily    heparin (porcine) subcutaneous injection 5,000 Units  5,000 Units Subcutaneous Q8H [facility]    metoprolol tartrate (LOPRESSOR) tablet 50 mg  50 mg Oral Q12H KIKO    pantoprazole (PROTONIX) EC tablet 40 mg  40 mg Oral Early Morning       VTE Pharmacologic Prophylaxis: Heparin  VTE Mechanical Prophylaxis: sequential compression device    Portions of the record may have been created with voice recognition software  Occasional wrong word or "sound a like" substitutions may have occurred due to the inherent limitations of voice recognition software    Read the chart carefully and recognize, using context, where substitutions have occurred   ==  Yisel Nguyen, [facility]  Internal Medicine Residency PGY-2

## 2021-03-29 NOTE — PLAN OF CARE
Problem: GASTROINTESTINAL - ADULT  Goal: Maintains or returns to baseline bowel function  Description: INTERVENTIONS:  - Assess bowel function  - Encourage oral fluids to ensure adequate hydration  - Administer IV fluids if ordered to ensure adequate hydration  - Administer ordered medications as needed  - Encourage mobilization and activity  - Consider nutritional services referral to assist patient with adequate nutrition and appropriate food choices  Outcome: Progressing  Goal: Maintains adequate nutritional intake  Description: INTERVENTIONS:  - Monitor percentage of each meal consumed  - Identify factors contributing to decreased intake, treat as appropriate  - Assist with meals as needed  - Monitor I&O, weight, and lab values if indicated  - Obtain nutrition services referral as needed  Outcome: Progressing     Problem: GENITOURINARY - ADULT  Goal: Maintains or returns to baseline urinary function  Description: INTERVENTIONS:  - Assess urinary function  - Encourage oral fluids to ensure adequate hydration if ordered  - Administer IV fluids as ordered to ensure adequate hydration  - Administer ordered medications as needed  - Offer frequent toileting  - Follow urinary retention protocol if ordered  Outcome: Progressing     Problem: METABOLIC, FLUID AND ELECTROLYTES - ADULT  Goal: Electrolytes maintained within normal limits  Description: INTERVENTIONS:  - Monitor labs and assess patient for signs and symptoms of electrolyte imbalances  - Administer electrolyte replacement as ordered  - Monitor response to electrolyte replacements, including repeat lab results as appropriate  - Instruct patient on fluid and nutrition as appropriate  Outcome: Progressing     Problem: SKIN/TISSUE INTEGRITY - ADULT  Goal: Skin integrity remains intact  Description: INTERVENTIONS  - Identify patients at risk for skin breakdown  - Assess and monitor skin integrity  - Assess and monitor nutrition and hydration status  - Monitor labs (i e  albumin)  - Assess for incontinence   - Turn and reposition patient  - Assist with mobility/ambulation  - Relieve pressure over bony prominences  - Avoid friction and shearing  - Provide appropriate hygiene as needed including keeping skin clean and dry  - Evaluate need for skin moisturizer/barrier cream  - Collaborate with interdisciplinary team (i e  Nutrition, Rehabilitation, etc )   - Patient/family teaching  Outcome: Progressing     Problem: PAIN - ADULT  Goal: Verbalizes/displays adequate comfort level or baseline comfort level  Description: Interventions:  - Encourage patient to monitor pain and request assistance  - Assess pain using appropriate pain scale  - Administer analgesics based on type and severity of pain and evaluate response  - Implement non-pharmacological measures as appropriate and evaluate response  - Consider cultural and social influences on pain and pain management  - Notify physician/advanced practitioner if interventions unsuccessful or patient reports new pain  Outcome: Progressing     Problem: SAFETY ADULT  Goal: Patient will remain free of falls  Description: INTERVENTIONS:  - Assess patient frequently for physical needs  -  Identify cognitive and physical deficits and behaviors that affect risk of falls    -  [place] fall precautions as indicated by assessment   - Educate patient/family on patient safety including physical limitations  - Instruct patient to call for assistance with activity based on assessment  - Modify environment to reduce risk of injury  - Consider OT/PT consult to assist with strengthening/mobility  Outcome: Progressing

## 2021-03-29 NOTE — UTILIZATION REVIEW
Initial Clinical Review    OBS  3/26 @ 2337 UPGRADED TO INPATIENT 3/28 @ 515 Arthur Rojas DESHAWN     03/28/21 1515  Inpatient Admission Once     Question Answer Comment   Level of Care Med Surg        03/28/21 1515       ED Arrival Information     Expected Arrival Acuity Means of Arrival Escorted By Service Admission Type    - 3/26/2021 18:08 Urgent Ambulance [facility] General Medicine Urgent    Arrival Complaint    abd pain        Chief Complaint   Patient presents with    Abdominal Pain     Pt c/o epigastric pain, states he feels his food isn't going down  Seen at [facility] 3 days ago, no relief with meds  Also c/o SOB on exertion  Assessment/Plan:  47 y/o male with PMHx of HTN, HFrEF with EF of 20% with AICD who presents to ED via EMS with epigastric/RUQ abdominal pain  Pt recently [facility] to [facility] on 3/24 with same complaint  During previous w/u RUQ neg for cholecystitis and reported having improvement on d/c after having PPI  Cr 1 52  Trop elevated  Mylanta, lidocaine viscous, carafrate and protonix given in ED today  Admit observation to M/S unit plan for mylanta prn  Continue protonix daily  Clear liquid diet, advance as wendy  Trend Cr  Hold home lasix and lisinopril but continue metoprolol  Chronic elevation in troponin btwn 0 12-0 19, eval'd last admission by cardiology  Continue to monitor trops  EKG  Telemetry  Nephrology consult 3/28 -- DESHAWN noted his creatinine has been fluctuating around 1 1 up to 1 4 since 2016  Creatinine on admission 1 84, patient received IVF's,  diuretics and Ace inhibitors on hold  Agree with urinalysis with urine sodium  Keep holding diuretics for now  Encourage oral intake  Avoid relative hypotension as well as BP changes, noted his BP variability is pronounced with some SBP readings >200 and then down to 99 overnight  Keep SBP's >130 for now  Follow daily labs  Monitor ins and outs  Low sodium diet       3/29 -- AM labs pending as patient refused labs this morning  Will f/u to see if there is improvement  Continue to hold IV Lasix and po lisinopril  Continue metoprolol 50mg BID, plavix, aspirin and statin  Lasix and lisinopril on hold due to DESHAWN Continue protonix daily  Reg diet  Per neuro -- -DESHAWN suspect in the setting of prerenal, use of ACE-inhibitor, diuretics, BP variability with occasional relative hypotension episodes  25g Albumin IV x1 today  Continue to hold diuretics and ACE  Check bladder scan with PVR  check UPCR-to quantify proteinuria  Await today's lab results-once reviewed further recommendations will be forthcoming    Continue I/O, daily wts      ED Triage Vitals [03/26/21 1838]   Temperature Pulse Respirations Blood Pressure SpO2   98 3 °F (36 8 °C) (!) 53 18 (!) 143/121 100 %      Temp Source Heart Rate Source Patient Position - Orthostatic VS BP Location FiO2 (%)   Oral Monitor Sitting Left arm --      Pain Score       7          Wt Readings from Last 1 Encounters:   03/26/21 111 kg (245 lb)     Additional Vital Signs:   Date/Time  Temp  Pulse  Resp  BP  MAP (mmHg)  SpO2  Calculated FIO2 (%) - Nasal Cannula  Nasal Cannula O2 Flow Rate (L/min)  O2 Device   03/29/21 1541  97 6 °F (36 4 °C)  81  16  128/78  --  95 %  --  --  --   03/29/21 15:40:04  97 4 °F (36 3 °C)Abnormal   --  16  62/49Abnormal   53  --  --  --  --   03/29/21 11:38:31  --  80  --  135/96  109  96 %  --  --  --   03/28/21 2000  --  --  --  --  --  --  36  4 L/min  Nasal cannula   03/28/21 15:12:40  98 9 °F (37 2 °C)  81  18  133/97  109  97 %  --  --  --   03/28/21 0930  --  85  --  --  --  75 %Abnormal   --  --  --   03/28/21 0900  --  85  --  134/101Abnormal   112  96 %  --  --  --   03/28/21 0830  --  88  --  --  --  83 %Abnormal   --  --  --   03/28/21 0500  --  --  --  --  --  --  36  4 L/min   Nasal cannula   Nasal Cannula O2 Flow Rate (L/min): placed on 4L @ 0000 at 03/28/21 0500   03/28/21 03:07:23  99 °F (37 2 °C)  78  16  99/81  87  97 %  --  --  -- Date/Time  Temp  Pulse  Resp  BP  MAP (mmHg)  SpO2  O2 Device  Patient Position - Orthostatic VS   03/27/21 09:29:35  100 1 °F (37 8 °C)  86  20  121/93  102  97 %  --  --   03/27/21 0635  --  88  18  152/104Abnormal   122  96 %  None (Room air)  Lying   03/27/21 0302  --  90  29Abnormal   129/77  --  99 %  None (Room air)  Sitting   03/27/21 0047  --  103  23Abnormal   114/68  77  99 %  None (Room air)  Lying   03/26/21 2351  --  105  40Abnormal   138/99  113  95 %  None (Room air)  Lying   03/26/21 2245  --  104  41Abnormal   231/111Abnormal   135  96 %  None (Room air)  Lying   03/26/21 2047  98 4 °F (36 9 °C)  106Abnormal   43Abnormal   --  --  98 %  --  Lying   03/26/21 1838  98 3 °F (36 8 °C)  53Abnormal   18  143/121Abnormal   --  100 %  None (Room air)  Sitting       Pertinent Labs/Diagnostic Test Results:   CXR 3/27 --   1   Stable cardiomegaly  2   Clear lungs       Results from last 7 days   Lab Units 03/29/21  1430 03/28/21  0852 03/27/21 0628 03/26/21 2039 03/24/21  0208   WBC Thousand/uL 4 74 5 53 5 93 5 56 5 05   HEMOGLOBIN g/dL 14 3 14 6 14 4 14 3 13 7   HEMATOCRIT % 43 8 44 5 43 9 44 5 42 4   PLATELETS Thousands/uL 162 151 164 176 189   NEUTROS ABS Thousands/µL  --   --   --  3 55 2 78     Results from last 7 days   Lab Units 03/29/21  1430 03/28/21  0852 03/27/21 0628 03/26/21 2039 03/24/21  0208   SODIUM mmol/L 138 138 136 137 140   POTASSIUM mmol/L 4 0 4 6 4 6 4 4 3 5   CHLORIDE mmol/L 108 107 106 107 103   CO2 mmol/L 23 24 23 23 29   ANION GAP mmol/L 7 7 7 7 8   BUN mg/dL 25 27* 21 18 14   CREATININE mg/dL 1 79* 1 97* 1 94* 1 84* 1 52*   EGFR ml/min/1 73sq m 51 45 46 49 62   CALCIUM mg/dL 8 1* 7 8* 8 4 8 4 8 6   MAGNESIUM mg/dL  --   --   --   --  1 9     Results from last 7 days   Lab Units 03/29/21  1430 03/27/21  0628 03/26/21  2039 03/24/21  0208   AST U/L 115* 107* 90* 25   ALT U/L 147* 107* 98* 30   ALK PHOS U/L 70 66 66 53 1   TOTAL PROTEIN g/dL 6 6 7 0 7 4 6 7   ALBUMIN g/dL 2  1* 2 5* 2 7* 3 0*   TOTAL BILIRUBIN mg/dL 0 78 1 48* 1 31* 1 27*     Results from last 7 days   Lab Units 03/29/21  1430 03/28/21  0852 03/27/21  0628 03/26/21 2039 03/24/21  0208   GLUCOSE RANDOM mg/dL 139 84 104 127 135     Results from last 7 days   Lab Units 03/24/21  0208   HEMOGLOBIN A1C % 6 1*   EAG mg/dl 128     Results from last 7 days   Lab Units 03/28/21  0852   CK TOTAL U/L 181   CK MB INDEX % <1 0   CK MB ng/mL 1 1     Results from last 7 days   Lab Units 03/27/21  0628 03/27/21  0303 03/26/21  2352 03/26/21 2039 03/24/21  0658   TROPONIN I ng/mL 0 20* 0 21* 0 18* 0 19* 0 12*     Results from last 7 days   Lab Units 03/24/21  0215   LACTIC ACID mmol/L 1 7     Results from last 7 days   Lab Units 03/24/21  0658   NT-PRO BNP pg/mL 8,436*     Results from last 7 days   Lab Units 03/26/21 2039 03/24/21  0208   LIPASE u/L 108 16     Results from last 7 days   Lab Units 03/29/21  1145 03/29/21  0550 03/28/21  1712   CLARITY UA   --   --  Clear   COLOR UA   --   --  Dk Yellow   SPEC GRAV UA   --   --  1 029   PH UA   --   --  6 0   GLUCOSE UA mg/dl  --   --  Negative   KETONES UA mg/dl  --   --  Negative   BLOOD UA   --   --  Large*   PROTEIN UA mg/dl  --   --  >=1000 (4+)*   NITRITE UA   --   --  Negative   BILIRUBIN UA   --   --  Interference- unable to analyze*   UROBILINOGEN UA E U /dl  --   --  1 0   LEUKOCYTES UA   --   --  Negative   WBC UA /hpf  --   --  None Seen   RBC UA /hpf  --   --  2-4   BACTERIA UA /hpf  --   --  Occasional   EPITHELIAL CELLS WET PREP /hpf  --   --  None Seen   SODIUM UR   --  21  --    CREATININE UR mg/dL 175 0 176 0  --    PROTEIN UR mg/dL 1,018  --   --    PROT/CREAT RATIO UR  5 82*  --   --        ED Treatment:   Medication Administration from 03/26/2021 1808 to 03/27/2021 0850       Date/Time Order Dose Route Action     03/26/2021 2055 sucralfate (CARAFATE) tablet 1 g 1 g Oral Given     03/26/2021 2056 Lidocaine Viscous HCl (XYLOCAINE) 2 % mucosal solution 15 mL 15 mL Swish & Swallow Given     03/26/2021 2055 aluminum-magnesium hydroxide-simethicone (MYLANTA) oral suspension 30 mL 30 mL Oral Given     03/26/2021 2211 ondansetron (ZOFRAN) injection 4 mg 4 mg Intravenous Given     03/26/2021 2254 pantoprazole (PROTONIX) injection 40 mg 40 mg Intravenous Given     03/27/2021 0048 metoprolol tartrate (LOPRESSOR) tablet 50 mg 50 mg Oral Given     03/27/2021 0603 heparin (porcine) subcutaneous injection 5,000 Units 5,000 Units Subcutaneous Not Given     03/27/2021 0048 heparin (porcine) subcutaneous injection 5,000 Units 5,000 Units Subcutaneous Given     03/27/2021 0048 sodium chloride 0 9 % infusion 75 mL/hr Intravenous New Bag     03/27/2021 0629 pantoprazole (PROTONIX) EC tablet 40 mg 40 mg Oral Given        Past Medical History:   Diagnosis Date    [facility]congestive heart failure) ([facility] )     Hypertension     Pacemaker      Present on Admission:   Atherosclerotic heart disease of native coronary artery without angina pectoris   Chronic systolic congestive heart failure (HCC)   Essential (primary) hypertension   Elevated troponin I level   [facility]   Abdominal pain      Admitting Diagnosis: Nausea [R11 0]  Epigastric pain [R10 13]  Abdominal pain [R10 9]  Elevated troponin I level [R77 8]  Age/Sex: 48 y o  male  Admission Orders:  Scheduled Medications:  aspirin, 81 mg, Oral, Daily  atorvastatin, 10 mg, Oral, Daily With Dinner  clopidogrel, 75 mg, Oral, Daily  heparin (porcine), 5,000 Units, Subcutaneous, Q8H [facility]  metoprolol tartrate, 50 mg, Oral, Q12H KIKO  pantoprazole, 40 mg, Oral, Early Morning    Continuous IV Infusions:  sodium chloride, 75 mL/hr, Intravenous, Continuous    PRN Meds:  acetaminophen, 650 mg, Oral, Q6H PRN  aluminum-magnesium hydroxide-simethicone, 30 mL, Oral, Q6H PRN        Network Utilization Review Department  ATTENTION: Please call with any questions or concerns to [phone] and carefully listen to the prompts so that you are directed to the right person  All voicemails are confidential   Marlene Handy all requests for admission clinical reviews, approved or denied determinations and any other requests to dedicated fax number below belonging to the campus where the patient is receiving treatment   List of dedicated fax numbers for the Facilities:  [address] DENIALS (Administrative/Medical Necessity) [phone]   [address] (Maternity/NICU/Pediatrics) [phone]   [address] Byron Pate 0459 (  Lydia Gallo "Chiara" 103) [id number] [place] [address] Lazaro Epperson 1481 [address] [place] [address] [phone]

## 2021-03-29 NOTE — PROGRESS NOTES
Progress Note - Nephrology   Beverly Still 48 y o  male MRN: [id number]  Unit/Bed#: [address] 622-01 Encounter: [id number]    ASSESSMENT and PLAN:  Acute kidney injury (POA):  Etiology: Suspect prerenal with diuretic use, relative hypotension and perturbations of blood pressure and loss of autoregulatory system the use of ACE-inhibitor   After review of medical records through [address] [place] [address] it appears that the patient has a baseline Creatinine of 1 1-1 4 dating back to 2016   Lisinopril in and Lasix on hold   Patient was admitted with a creatinine of 1 52   Peak creatinine 1 97 3/28/2021-hopefully plateauing   Patient's creatinine today-labs need to be drawn this a m -pending   Previous Acid base and lytes stable    Clinically, patient is not uremic and there is no acute indication for renal replacement therapy (dialysis)  Workup:   Urinalysis with micro 3/28/21 reports:  SG 1 029, large blood,+4 proteinuria, hyaline casts 0-3   Urine sodium-21   Right upper quadrant reveals right kidney measuring 11 1 x 4 7 x 5 5 cm with mid pole right kidney cyst measuring 1 2 x 0 8 x 0 9 cm  Plan:   Avoid nephrotoxins, adjust meds to appropriate GFR   Optimize hemodynamic status to avoid delay in renal recovery     Continue to hold Ace inhibitors-lisinopril 10 mg daily   Continue to hold diuretic-Lasix 40 mg daily   Will check bladder scan with PVR   Will check UPCR-to quantify proteinuria   Await today's lab results-once reviewed further recommendations will be forthcoming   Will monitor I/O, lab values volume status   Check daily weights    Suspect Chronic kidney disease II/IIIA:    Etiology:  Suspect secondary to hypertension the setting of decreased EF of 20%, now with proteinuria  Assessment and Plan:   After review of medical records through [facility] and Care everywhere it appears that the patient has a baseline Creatinine of 1 1-1 4   Does not follow with Nephrology as outpatient   Will need follow-up on discharge    Blood pressure/Hypertension:  BP less fluctuating  Assessment and Plan:   Home medications include:  Furosemide 40 mg daily, lisinopril 10 mg daily, Lopressor 50 mg every 12 hours   Current medications include:  Lopressor 50 mg every 12 hours   Maximize hemodynamics to maintain MAP >65   Avoid hypotension or fluctuations in blood pressure   Will continue to trend    Acid-base: current bicarb 24  Assessment and Plan:  · No change in current regimen    Electrolytes: Within normal limits  Assessment and Plan:  · No change in current regimen    Other medical Issues:  Abdominal pain:  Appears resolved-patient denies abdominal pain, nausea vomiting  · Per primary team  Chronic systolic congestive heart failure:  Status post AICD  Prior echocardiogram 10/14/2020 revealed EF of 20% with severe diffuse hypokinesis  · Furosemide and lisinopril currently on hold  · Patient examining euvolemic  · Will continue trend for now        SUBJECTIVE:  Patient seen and examined at bedside  Denies chest pain, shortness of breath, nausea, vomiting or abdominal pain  Patient reports eating well without difficulty    Denies urinary difficulty    OBJECTIVE:  Current Weight: Weight - Scale: 111 kg (245 lb)  Vitals:    03/28/21 2058 03/28/21 2217 03/29/21 0204 03/29/21 0854   BP: 132/96 129/96 119/95 140/99   Pulse: 90 76 79 62   Resp:  20 22 16   Temp:  97 6 °F (36 4 °C) 98 4 °F (36 9 °C)    TempSrc:       SpO2: 94% 95% 91% 95%   Weight:       Height:           Intake/Output Summary (Last 24 hours) at 3/29/2021 9059  Last data filed at 3/28/2021 1835  Gross per 24 hour   Intake 525 ml   Output --   Net 525 ml     General:  No acute distress, cooperative, laying flat   Skin:  Warm and dry without rash  ENMT:  Mucous membranes moist, sclera anicteric  Neck:  Supple without JVD  Respiratory:  Essentially clear on auscultation without crackles, rhonchi, wheezes  Cardiac:  Regular rate and rhythm without rub  GI:  Soft, nontender, no distention, active bowel sounds  Extremities:  No significant edema noted bilaterally  Neuro:  Alert oriented and awake  Psych:  Appropriate affect    Medications:    Current Facility-Administered Medications:     acetaminophen (TYLENOL) tablet 650 mg, 650 mg, Oral, Q6H PRN, Alina Sheehan DO    aspirin (ECOTRIN LOW STRENGTH) EC tablet 81 mg, 81 mg, Oral, Daily, Alina Sheehan DO, 81 mg at 03/28/21 0858    atorvastatin (LIPITOR) tablet 10 mg, 10 mg, Oral, Daily With Niraliroman Melvin DO, 10 mg at 03/28/21 1641    clopidogrel (PLAVIX) tablet 75 mg, 75 mg, Oral, Daily, Alina Sheehan DO, 75 mg at 03/28/21 0858    heparin (porcine) subcutaneous injection 5,000 Units, 5,000 Units, Subcutaneous, Q8H [address], 5,000 Units at 03/29/21 0131 **AND** [CANCELED] Platelet count, , , Once, Triage Protocol Emergency, MD    metoprolol tartrate (LOPRESSOR) tablet 50 mg, 50 mg, Oral, Q12H [address], Alina Sheehan DO, 50 mg at 03/28/21 2059    pantoprazole (PROTONIX) EC tablet 40 mg, 40 mg, Oral, Early Morning, Otoniel Molina MD, 40 mg at 03/29/21 0538    Laboratory Results:  Results from last 7 days   Lab Units 03/28/21  0852 03/27/21  0628 03/26/21  2039 03/24/21  0208   WBC Thousand/uL 5 53 5 93 5 56 5 05   HEMOGLOBIN g/dL 14 6 14 4 14 3 13 7   HEMATOCRIT % 44 5 43 9 44 5 42 4   PLATELETS Thousands/uL 151 164 176 189   SODIUM mmol/L 138 136 137 140   POTASSIUM mmol/L 4 6 4 6 4 4 3 5   CHLORIDE mmol/L 107 106 107 103   CO2 mmol/L 24 23 23 29   BUN mg/dL 27* 21 18 14   CREATININE mg/dL 1 97* 1 94* 1 84* 1 52*   CALCIUM mg/dL 7 8* 8 4 8 4 8 6   MAGNESIUM mg/dL  --   --   --  1 9

## 2021-03-30 LAB
ANION GAP SERPL CALCULATED.3IONS-SCNC: 6 MMOL/L (ref 4–13)
BUN SERPL-MCNC: 22 MG/DL (ref 5–25)
CALCIUM SERPL-MCNC: 8.2 MG/DL (ref 8.3–10.1)
CHLORIDE SERPL-SCNC: 110 MMOL/L (ref 100–108)
CO2 SERPL-SCNC: 24 MMOL/L (ref 21–32)
CREAT SERPL-MCNC: 1.5 MG/DL (ref 0.6–1.3)
ERYTHROCYTE [DISTWIDTH] IN BLOOD BY AUTOMATED COUNT: 14.8 % (ref 11.6–15.1)
GFR SERPL CREATININE-BSD FRML MDRD: 63 ML/MIN/1.73SQ M
GLUCOSE SERPL-MCNC: 90 MG/DL (ref 65–140)
HBV CORE AB SER QL: NORMAL
HBV CORE IGM SER QL: NORMAL
HBV SURFACE AG SER QL: NORMAL
HCT VFR BLD AUTO: 44 % (ref 36.5–49.3)
HCV AB SER QL: NORMAL
HGB BLD-MCNC: 14.4 G/DL (ref 12–17)
MAGNESIUM SERPL-MCNC: 2 MG/DL (ref 1.6–2.6)
MCH RBC QN AUTO: 31.2 PG (ref 26.8–34.3)
MCHC RBC AUTO-ENTMCNC: 32.7 G/DL (ref 31.4–37.4)
MCV RBC AUTO: 95 FL (ref 82–98)
NRBC BLD AUTO-RTO: 1 /100 WBCS
PHOSPHATE SERPL-MCNC: 2.4 MG/DL (ref 2.7–4.5)
PLATELET # BLD AUTO: 147 THOUSANDS/UL (ref 149–390)
PMV BLD AUTO: 11 FL (ref 8.9–12.7)
POTASSIUM SERPL-SCNC: 4 MMOL/L (ref 3.5–5.3)
RBC # BLD AUTO: 4.62 MILLION/UL (ref 3.88–5.62)
SODIUM SERPL-SCNC: 140 MMOL/L (ref 136–145)
WBC # BLD AUTO: 5.31 THOUSAND/UL (ref 4.31–10.16)

## 2021-03-30 PROCEDURE — 80048 BASIC METABOLIC PNL TOTAL CA: CPT | Performed by: NURSE PRACTITIONER

## 2021-03-30 PROCEDURE — 86705 HEP B CORE ANTIBODY IGM: CPT | Performed by: STUDENT IN AN ORGANIZED HEALTH CARE EDUCATION/TRAINING PROGRAM

## 2021-03-30 PROCEDURE — 86704 HEP B CORE ANTIBODY TOTAL: CPT | Performed by: STUDENT IN AN ORGANIZED HEALTH CARE EDUCATION/TRAINING PROGRAM

## 2021-03-30 PROCEDURE — 99232 SBSQ HOSP IP/OBS MODERATE 35: CPT | Performed by: INTERNAL MEDICINE

## 2021-03-30 PROCEDURE — 83735 ASSAY OF MAGNESIUM: CPT | Performed by: STUDENT IN AN ORGANIZED HEALTH CARE EDUCATION/TRAINING PROGRAM

## 2021-03-30 PROCEDURE — 86803 HEPATITIS C AB TEST: CPT | Performed by: STUDENT IN AN ORGANIZED HEALTH CARE EDUCATION/TRAINING PROGRAM

## 2021-03-30 PROCEDURE — 84100 ASSAY OF PHOSPHORUS: CPT | Performed by: STUDENT IN AN ORGANIZED HEALTH CARE EDUCATION/TRAINING PROGRAM

## 2021-03-30 PROCEDURE — 87340 HEPATITIS B SURFACE AG IA: CPT | Performed by: STUDENT IN AN ORGANIZED HEALTH CARE EDUCATION/TRAINING PROGRAM

## 2021-03-30 PROCEDURE — 85025 COMPLETE CBC W/AUTO DIFF WBC: CPT | Performed by: STUDENT IN AN ORGANIZED HEALTH CARE EDUCATION/TRAINING PROGRAM

## 2021-03-30 RX ORDER — FUROSEMIDE 20 MG/1
20 TABLET ORAL DAILY
Status: DISCONTINUED | OUTPATIENT
Start: 2021-03-31 | End: 2021-03-31 | Stop reason: HOSPADM

## 2021-03-30 RX ADMIN — HEPARIN SODIUM 5000 UNITS: 5000 INJECTION INTRAVENOUS; SUBCUTANEOUS at 08:54

## 2021-03-30 RX ADMIN — ATORVASTATIN CALCIUM 10 MG: 10 TABLET, FILM COATED ORAL at 17:42

## 2021-03-30 RX ADMIN — DIBASIC SODIUM PHOSPHATE, MONOBASIC POTASSIUM PHOSPHATE AND MONOBASIC SODIUM PHOSPHATE 1 TABLET: 852; 155; 130 TABLET ORAL at 21:16

## 2021-03-30 RX ADMIN — HEPARIN SODIUM 5000 UNITS: 5000 INJECTION INTRAVENOUS; SUBCUTANEOUS at 17:42

## 2021-03-30 RX ADMIN — HEPARIN SODIUM 5000 UNITS: 5000 INJECTION INTRAVENOUS; SUBCUTANEOUS at 00:36

## 2021-03-30 RX ADMIN — PANTOPRAZOLE SODIUM 40 MG: 40 TABLET, DELAYED RELEASE ORAL at 05:44

## 2021-03-30 RX ADMIN — METOPROLOL TARTRATE 50 MG: 50 TABLET, FILM COATED ORAL at 21:16

## 2021-03-30 RX ADMIN — ASPIRIN 81 MG: 81 TABLET, COATED ORAL at 08:53

## 2021-03-30 RX ADMIN — CLOPIDOGREL BISULFATE 75 MG: 75 TABLET, FILM COATED ORAL at 08:54

## 2021-03-30 RX ADMIN — DIBASIC SODIUM PHOSPHATE, MONOBASIC POTASSIUM PHOSPHATE AND MONOBASIC SODIUM PHOSPHATE 1 TABLET: 852; 155; 130 TABLET ORAL at 17:42

## 2021-03-30 RX ADMIN — METOPROLOL TARTRATE 50 MG: 50 TABLET, FILM COATED ORAL at 09:25

## 2021-03-30 NOTE — PROGRESS NOTES
Progress Note - Nephrology   Anthony Farrell 48 y o  male MRN: [id number]  Unit/Bed#: [facility] 622-01 Encounter: [id number]    ASSESSMENT and PLAN:  Acute kidney injury (POA):  Etiology: Suspect prerenal with diuretic use, relative hypotension and perturbations of blood pressure and loss of autoregulatory system with the use of ACE-inhibitor  · After review of medical records through [address] [place] [address] it appears that the patient has a baseline Creatinine of 1 1-1 4 dating back to 2016  · Lisinopril and Lasix remain on hold currently  · Patient was admitted with a creatinine of 1 52  · Peak creatinine 1 97 3/28/2021  · Patient's creatinine today 1 50 with stable electrolytes and acid base  · Clinically, patient is not uremic and there is no acute indication for renal replacement therapy (dialysis)  Workup:  · Urinalysis with micro 3/28/21 reports:  SG 1 029, large blood,+4 proteinuria, hyaline casts 0-3  · Urine sodium-21  · FeNa-0 17%  · UPCR 5 82  · Bladder scan 48 ml  · Right upper quadrant reveals right kidney measuring 11 1 x 4 7 x 5 5 cm with mid pole right kidney cyst measuring 1 2 x 0 8 x 0 9 cm  Plan:  · Avoid nephrotoxins, adjust meds to appropriate GFR  · Optimize hemodynamic status to avoid delay in renal recovery    · Continue to hold lisinopril 10 mg -will evaluate and resume with outpatient appointment  · Recommend resume lasix half dose at 20 mg tomorrow/on discharge  · Will monitor I/O, lab values volume status  · Check daily weights     Suspect Chronic kidney disease II/IIIA:    Etiology:  Suspect secondary to hypertension the setting of decreased EF of 20%, now with proteinuria  Assessment and Plan:  · After review of medical records through [facility] and Care everywhere it appears that the patient has a baseline Creatinine of 1 1-1 4  · Does not follow with Nephrology as outpatient  · Patient for hospital follow up appt on 4/8/2021 at [facility]     Blood pressure/Hypertension: BP less fluctuating and more stable  Assessment and Plan:  · Previous home medications include:  Furosemide 40 mg daily, lisinopril 10 mg daily, Lopressor 50 mg every 12 hours  · Current medications include:  Lopressor 50 mg every 12 hours  · Maximize hemodynamics to maintain MAP >65  · Avoid hypotension or fluctuations in blood pressure  · Okay to resume Lasix at half dose at 20 mg daily-tomorrow  · Continue to hold lisinopril until evaluated in office on 4/8/2021    Proteinuria:  Suspect secondary to hypertension as well in the setting of DESHAWN  · Nephrotic range proteinuria with UPCR 5 82  · With improving creatinine, will repeat UPCR in steady state as outpatient    Acid-base: current bicarb 24  Assessment and Plan:  · No change in current regimen     Electrolytes: Within normal limits  Assessment and Plan:  · No change in current regimen     Other medical Issues:  Abdominal pain:  Resolved-patient denies abdominal pain, nausea vomiting  · Per primary team  Chronic systolic congestive heart failure:  Status post AICD  · Prior echocardiogram 10/14/2020 revealed EF of 20% with severe diffuse hypokinesis  · Furosemide and lisinopril currently on hold  · Patient examining euvolemic  · As above okay to resume Lasix half dose of 20 mg starting tomorrow  · Will continue trend for now    Updated primary team via tiger text with current plan     Disposition:  Renal function improving and almost back to baseline with creatinine 1 50  Okay for discharge from renal standpoint  Will have patient return to [facility] office for hospital follow-up on 04/08/2021 with Dr Italia Kelsey  Will get BMP in one week  Continue to hold Lisinopril until seen in office with updated BMP  Please discharge on Lasix half dose at 20 mg daily starting tomorrow  SUBJECTIVE:  Patient seen and examined at bedside  Denies chest pain or shortness of breath    Frustrated with blood work requiring multiple attempts for am blood work     OBJECTIVE:  Current Weight: Weight - Scale: 111 kg (245 lb)  Vitals:    03/29/21 2238 03/29/21 2240 03/30/21 0313 03/30/21 0747   BP: (!) 133/102 142/89 134/89 145/78   BP Location:  Right arm  Right arm   Pulse:    78   Resp: 18   16   Temp: (!) 97 3 °F (36 3 °C)  (!) 97 1 °F (36 2 °C) (!) 97 1 °F (36 2 °C)   TempSrc:       SpO2:    95%   Weight:       Height:           Intake/Output Summary (Last 24 hours) at 3/30/2021 1002  Last data filed at 3/30/2021 0901  Gross per 24 hour   Intake 240 ml   Output 400 ml   Net -160 ml     General:  No acute distress, cooperative, lying flat on right side  Skin:  Warm and dry without rash  ENMT:  Mucous membranes moist, sclera anicteric  Neck:  Supple without JVD  Respiratory:  Essentially clear on auscultation without crackles, rhonchi, wheezes  Cardiac:  Regular rate and rhythm without rub, or murmur  GI:  Soft, nontender, no distention, active bowel sounds  Extremities:  Trace bilateral lower extremity edema   Neuro:  Alert oriented and awake  Psych:  Appropriate affect    Medications:    Current Facility-Administered Medications:     acetaminophen (TYLENOL) tablet 650 mg, 650 mg, Oral, Q6H PRN, Lino Ferreira DO    aspirin (ECOTRIN LOW STRENGTH) EC tablet 81 mg, 81 mg, Oral, Daily, Lino Ferreira DO, 81 mg at 03/30/21 0853    atorvastatin (LIPITOR) tablet 10 mg, 10 mg, Oral, Daily With Rajeev Toribio DO, 10 mg at 03/29/21 1658    clopidogrel (PLAVIX) tablet 75 mg, 75 mg, Oral, Daily, Lino Ferreira DO, 75 mg at 03/30/21 0854    heparin (porcine) subcutaneous injection 5,000 Units, 5,000 Units, Subcutaneous, Q8H [address], 5,000 Units at 03/30/21 0854 **AND** [CANCELED] Platelet count, , , Once, Triage Protocol Emergency, MD    metoprolol tartrate (LOPRESSOR) tablet 50 mg, 50 mg, Oral, Q12H [address], Lino Ferreira DO, 50 mg at 03/30/21 0925    pantoprazole (PROTONIX) EC tablet 40 mg, 40 mg, Oral, Early Morning, Leyla Rankin MD, 40 mg at 03/30/21 0544    Laboratory Results:  Results from last 7 days   Lab Units 03/30/21  0529 03/29/21  1430 03/28/21  0852 03/27/21  0628 03/26/21 2039 03/24/21  0208   WBC Thousand/uL 5 31 4 74 5 53 5 93 5 56 5 05   HEMOGLOBIN g/dL 14 4 14 3 14 6 14 4 14 3 13 7   HEMATOCRIT % 44 0 43 8 44 5 43 9 44 5 42 4   PLATELETS Thousands/uL 147* 162 151 164 176 189   SODIUM mmol/L 140 138 138 136 137 140   POTASSIUM mmol/L 4 0 4 0 4 6 4 6 4 4 3 5   CHLORIDE mmol/L 110* 108 107 106 107 103   CO2 mmol/L 24 23 24 23 23 29   BUN mg/dL 22 25 27* 21 18 14   CREATININE mg/dL 1 50* 1 79* 1 97* 1 94* 1 84* 1 52*   CALCIUM mg/dL 8 2* 8 1* 7 8* 8 4 8 4 8 6   MAGNESIUM mg/dL  --   --   --   --   --  1 9

## 2021-03-30 NOTE — PROGRESS NOTES
INTERNAL MEDICINE RESIDENCY PROGRESS NOTE     Name: Elaine Miranda   Age & Sex: 48 y o  male   MRN: [id number]  Unit/Bed#: [facility] [address] 65-5   Encounter: [id number]  Team: SOD Team B     PATIENT INFORMATION     Name: Elaine Miranda   Age & Sex: 48 y o  male   MRN: [place] Stay Days: 2    ASSESSMENT/PLAN     Principal Problem:    DESHAWN (acute kidney injury) ([facility] 75 )  Active Problems:    Atherosclerotic heart disease of native coronary artery without angina pectoris    Chronic systolic congestive heart failure ([facility] )    Essential (primary) hypertension    Elevated troponin I level      Elevated troponin I level  Assessment & Plan  Chronic elevation in troponin btwn 0 12-0  19  Likely non MI related with stable EKG on admission   -Monitor telemetry    Essential (primary) hypertension  Assessment & Plan  · Home medications include:  Furosemide 40 mg daily, lisinopril 10 mg daily, Lopressor 50 mg every 12 hours  · Current medications include:  Lopressor 50 mg every 12 hours  · Maximize hemodynamics to maintain MAP >65  · Avoid hypotension or fluctuations in blood pressure  · Will resume Lasix 20 mg in the a m , continue to hold lisinopril 10mg in the setting of DESHAWN  · Continue with home medication metoprolol tartrate 50mg q 12    Chronic systolic congestive heart failure (HCC)  Assessment & Plan  Wt Readings from Last 3 Encounters:   03/26/21 111 kg (245 lb)   03/24/21 115 kg (253 lb 8 5 oz)   10/13/20 104 kg (230 lb)        Echocardiogram from 95/1478 showed systolic function was severely reduced with ejection fraction estimated to be 20 % with severe diffuse hypokinesis    Has single chamber AICD in place  Seen by cardiology during recent admission at [facility]   Patient appears euvolemic, denies chest pain, shortness of breath, palpitation, headaches, lightheadedness    -Continue metoprolol 50mg BID, plavix, aspirin and statin  -Lasix and lisinopril on hold due to DESHAWN  - kidney function improving, will resume Lasix 20 mg daily in the a m , continue to hold lisinopril until outpatient follow-up with Nephrology  - continue I&O, weight measurement, cardiac diet 2 g sodium  -Will need to establish outpatient follow up with cardiology as pt is new to the area    * DESHAWN (acute kidney injury) [facility])  Assessment & Plan  Presented with Cr of 1 84 on admission (baseline likely 1 1-1 4) in the setting of poor PO intake due to his abdominal pain  Most recent Creatinine 1 97  worsening kidney function in the setting of CHF with low EF of 20%  Likely pre renal,  FENA 0 2%  He is s/p gentle hydration will with IV fluids with no improvement in kidney function  He appears euvolemic   - Urinalysis with micro 3/28/21 reports:  SG 1 029, large blood,+4 proteinuria, hyaline casts 0-3  Urine sodium-21, Right upper quadrant reveals right kidney measuring 11 1 x 4 7 x 5 5 cm with mid pole right kidney cyst measuring 1 2 x 0 8 x 0 9 cm   Plan  - nephrology consulted recommendation appreciated  - Will continue to hold lisinopril 10 mg -nephrology will evaluate and resume with outpatient appointment  -  home medications: lasix 40mg and lisinopril 10mg were held due to DESHAWN  - Per nephrology, resume lasix 20mg tomorrow since kidney function is improving  -trend Cr/follow-up BMP in a m  Abdominal pain-resolved as of 3/28/2021  Assessment & Plan  Constant epigastric/RUQ abdominal pain that intermittently worsens after eating, laying flat on his back and when he is walking  Likely peptic ulcer disease vs gastritis  Recent RUQ US showed hepatic and R renal cysts, but no signs of cholecystitis  -received mylanta, lidocaine viscous, carafrate and protonix in the ED  -mylanta PRN  -continue protonix 40mg daily  - recommend outpatient colonoscopy for colon cancer screening  -patient now on regular diet      Disposition:  Will continue inpatient management    Possible discharge tomorrow     SUBJECTIVE     Patient seen and examined by bedside sitting comfortably on the bed not in any obvious distress on room air  He had some nonsustained V-tach on telemetry this morning, patient on a my encounter is asymptomatic  He missed his metoprolol dose last night, which was held due to low blood pressure  Patient is stable at this time  No acute events overnight  OBJECTIVE     Vitals:    21 0313 21 0747 21 1132 21 1153   BP: 134/89 145/78 (!) 146/109 130/80   BP Location:  Right arm     Pulse:  78     Resp:  16 20    Temp: (!) 97 1 °F (36 2 °C) (!) 97 1 °F (36 2 °C) (!) 97 4 °F (36 3 °C)    TempSrc:       SpO2:  95%     Weight:       Height:          Temperature:   Temp (24hrs), Av 4 °F (36 3 °C), Min:97 1 °F (36 2 °C), Max:97 9 °F (36 6 °C)    Temperature: (!) 97 4 °F (36 3 °C)  Intake & Output:  I/O        07 -  0700  07 -  0700  07 -  0700    P  O  705 220 480    Total Intake(mL/kg) 705 (6 4) 220 (2) 480 (4 3)    Urine (mL/kg/hr)  400 (0 2)     Total Output  400     Net +705 -180 +480           Unmeasured Urine Occurrence 4 x  1 x        Weights:   IBW: 70 7 kg    Body mass index is 36 18 kg/m²  Weight (last 2 days)     None        Physical Exam  Vitals signs and nursing note reviewed  Constitutional:       Appearance: He is well-developed  HENT:      Head: Normocephalic and atraumatic  Mouth/Throat:      Mouth: Mucous membranes are moist    Eyes:      Extraocular Movements: Extraocular movements intact  Conjunctiva/sclera: Conjunctivae normal    Neck:      Musculoskeletal: Normal range of motion and neck supple  Cardiovascular:      Rate and Rhythm: Normal rate and regular rhythm  Heart sounds: No murmur  Pulmonary:      Effort: Pulmonary effort is normal  No respiratory distress  Breath sounds: Normal breath sounds  Abdominal:      Palpations: Abdomen is soft  Tenderness: There is no abdominal tenderness  Musculoskeletal: Normal range of motion        Comments: Trace pedal edema bilateral lower extremity   Skin:     General: Skin is warm and dry  Capillary Refill: Capillary refill takes less than 2 seconds  Neurological:      Mental Status: He is alert  LABORATORY DATA     Labs: I have personally reviewed pertinent reports  Results from last 7 days   Lab Units 03/30/21  0529 03/29/21  1430 03/28/21  0852  03/26/21 2039 03/24/21  0208   WBC Thousand/uL 5 31 4 74 5 53   < > 5 56 5 05   HEMOGLOBIN g/dL 14 4 14 3 14 6   < > 14 3 13 7   HEMATOCRIT % 44 0 43 8 44 5   < > 44 5 42 4   PLATELETS Thousands/uL 147* 162 151   < > 176 189   NEUTROS PCT %  --   --   --   --  65 55   MONOS PCT %  --   --   --   --  12 17*   MONO PCT %  --   --  6  --   --   --     < > = values in this interval not displayed  Results from last 7 days   Lab Units 03/30/21 0529 03/29/21  1430 03/28/21  0852 03/27/21 0628 03/26/21 2039   POTASSIUM mmol/L 4 0 4 0 4 6 4 6 4 4   CHLORIDE mmol/L 110* 108 107 106 107   CO2 mmol/L 24 23 24 23 23   BUN mg/dL 22 25 27* 21 18   CREATININE mg/dL 1 50* 1 79* 1 97* 1 94* 1 84*   CALCIUM mg/dL 8 2* 8 1* 7 8* 8 4 8 4   ALK PHOS U/L  --  70  --  66 66   ALT U/L  --  147*  --  107* 98*   AST U/L  --  115*  --  107* 90*     Results from last 7 days   Lab Units 03/30/21  0939 03/24/21  0208   MAGNESIUM mg/dL 2 0 1 9     Results from last 7 days   Lab Units 03/30/21  0939   PHOSPHORUS mg/dL 2 4*          Results from last 7 days   Lab Units 03/24/21  0215   LACTIC ACID mmol/L 1 7     Results from last 7 days   Lab Units 03/27/21  0628 03/27/21  0303 03/26/21  2352   TROPONIN I ng/mL 0 20* 0 21* 0 18*       IMAGING & DIAGNOSTIC TESTING     Radiology Results: I have personally reviewed pertinent reports  Xr Chest 2 Views    Result Date: 3/27/2021  Impression: 1  Stable cardiomegaly  2   Clear lungs  Workstation performed: [id number]     Other Diagnostic Testing: I have personally reviewed pertinent reports      ACTIVE MEDICATIONS     Current Facility-Administered Medications   Medication Dose Route Frequency    acetaminophen (TYLENOL) tablet 650 mg  650 mg Oral Q6H PRN    aspirin (ECOTRIN LOW STRENGTH) EC tablet 81 mg  81 mg Oral Daily    atorvastatin (LIPITOR) tablet 10 mg  10 mg Oral Daily With Dinner    clopidogrel (PLAVIX) tablet 75 mg  75 mg Oral Daily    [START ON 3/31/2021] furosemide (LASIX) tablet 20 mg  20 mg Oral Daily    heparin (porcine) subcutaneous injection 5,000 Units  5,000 Units Subcutaneous Q8H [address]    metoprolol tartrate (LOPRESSOR) tablet 50 mg  50 mg Oral Q12H [address]    pantoprazole (PROTONIX) EC tablet 40 mg  40 mg Oral Early Morning       VTE Pharmacologic Prophylaxis: Heparin  VTE Mechanical Prophylaxis: sequential compression device    Portions of the record may have been created with voice recognition software  Occasional wrong word or "sound a like" substitutions may have occurred due to the inherent limitations of voice recognition software    Read the chart carefully and recognize, using context, where substitutions have occurred   ==  Mary Streeter, [address] [facility]  Internal Medicine Residency PGY-2

## 2021-03-30 NOTE — PLAN OF CARE
Problem: GASTROINTESTINAL - ADULT  Goal: Maintains or returns to baseline bowel function  Description: INTERVENTIONS:  - Assess bowel function  - Encourage oral fluids to ensure adequate hydration  - Administer IV fluids if ordered to ensure adequate hydration  - Administer ordered medications as needed  - Encourage mobilization and activity  - Consider nutritional services referral to assist patient with adequate nutrition and appropriate food choices  Outcome: Progressing  Goal: Maintains adequate nutritional intake  Description: INTERVENTIONS:  - Monitor percentage of each meal consumed  - Identify factors contributing to decreased intake, treat as appropriate  - Assist with meals as needed  - Monitor I&O, weight, and lab values if indicated  - Obtain nutrition services referral as needed  Outcome: Progressing     Problem: GENITOURINARY - ADULT  Goal: Maintains or returns to baseline urinary function  Description: INTERVENTIONS:  - Assess urinary function  - Encourage oral fluids to ensure adequate hydration if ordered  - Administer IV fluids as ordered to ensure adequate hydration  - Administer ordered medications as needed  - Offer frequent toileting  - Follow urinary retention protocol if ordered  Outcome: Progressing     Problem: METABOLIC, FLUID AND ELECTROLYTES - ADULT  Goal: Electrolytes maintained within normal limits  Description: INTERVENTIONS:  - Monitor labs and assess patient for signs and symptoms of electrolyte imbalances  - Administer electrolyte replacement as ordered  - Monitor response to electrolyte replacements, including repeat lab results as appropriate  - Instruct patient on fluid and nutrition as appropriate  Outcome: Progressing     Problem: SKIN/TISSUE INTEGRITY - ADULT  Goal: Skin integrity remains intact  Description: INTERVENTIONS  - Identify patients at risk for skin breakdown  - Assess and monitor skin integrity  - Assess and monitor nutrition and hydration status  - Monitor labs (i e  albumin)  - Assess for incontinence   - Turn and reposition patient  - Assist with mobility/ambulation  - Relieve pressure over bony prominences  - Avoid friction and shearing  - Provide appropriate hygiene as needed including keeping skin clean and dry  - Evaluate need for skin moisturizer/barrier cream  - Collaborate with interdisciplinary team (i e  Nutrition, Rehabilitation, etc )   - Patient/family teaching  Outcome: Progressing     Problem: PAIN - ADULT  Goal: Verbalizes/displays adequate comfort level or baseline comfort level  Description: Interventions:  - Encourage patient to monitor pain and request assistance  - Assess pain using appropriate pain scale  - Administer analgesics based on type and severity of pain and evaluate response  - Implement non-pharmacological measures as appropriate and evaluate response  - Consider cultural and social influences on pain and pain management  - Notify physician/advanced practitioner if interventions unsuccessful or patient reports new pain  Outcome: Progressing     Problem: SAFETY ADULT  Goal: Patient will remain free of falls  Description: INTERVENTIONS:  - Assess patient frequently for physical needs  -  Identify cognitive and physical deficits and behaviors that affect risk of falls  -  [place] fall precautions as indicated by assessment   - Educate patient/family on patient safety including physical limitations  - Instruct patient to call for assistance with activity based on assessment  - Modify environment to reduce risk of injury  - Consider OT/PT consult to assist with strengthening/mobility  Outcome: Progressing     Problem: Potential for Falls  Goal: Patient will remain free of falls  Description: INTERVENTIONS:  - Assess patient frequently for physical needs  -  Identify cognitive and physical deficits and behaviors that affect risk of falls    -  [place] fall precautions as indicated by assessment   - Educate patient/family on patient safety including physical limitations  - Instruct patient to call for assistance with activity based on assessment  - Modify environment to reduce risk of injury  - Consider OT/PT consult to assist with strengthening/mobility  Outcome: Progressing

## 2021-03-31 VITALS
BODY MASS INDEX: 36.29 KG/M2 | DIASTOLIC BLOOD PRESSURE: 82 MMHG | WEIGHT: 245 LBS | HEART RATE: 78 BPM | SYSTOLIC BLOOD PRESSURE: 146 MMHG | OXYGEN SATURATION: 98 % | HEIGHT: 69 IN | TEMPERATURE: 97.8 F | RESPIRATION RATE: 16 BRPM

## 2021-03-31 LAB
ANION GAP SERPL CALCULATED.3IONS-SCNC: 7 MMOL/L (ref 4–13)
BASOPHILS # BLD AUTO: 0.01 THOUSANDS/ΜL (ref 0–0.1)
BASOPHILS NFR BLD AUTO: 0 % (ref 0–1)
BUN SERPL-MCNC: 19 MG/DL (ref 5–25)
CALCIUM SERPL-MCNC: 8.2 MG/DL (ref 8.3–10.1)
CHLORIDE SERPL-SCNC: 112 MMOL/L (ref 100–108)
CO2 SERPL-SCNC: 23 MMOL/L (ref 21–32)
CREAT SERPL-MCNC: 1.34 MG/DL (ref 0.6–1.3)
EOSINOPHIL # BLD AUTO: 0.06 THOUSAND/ΜL (ref 0–0.61)
EOSINOPHIL NFR BLD AUTO: 1 % (ref 0–6)
ERYTHROCYTE [DISTWIDTH] IN BLOOD BY AUTOMATED COUNT: 14.8 % (ref 11.6–15.1)
GFR SERPL CREATININE-BSD FRML MDRD: 72 ML/MIN/1.73SQ M
GLUCOSE SERPL-MCNC: 105 MG/DL (ref 65–140)
HCT VFR BLD AUTO: 44.9 % (ref 36.5–49.3)
HGB BLD-MCNC: 14.5 G/DL (ref 12–17)
IMM GRANULOCYTES # BLD AUTO: 0.03 THOUSAND/UL (ref 0–0.2)
IMM GRANULOCYTES NFR BLD AUTO: 1 % (ref 0–2)
LYMPHOCYTES # BLD AUTO: 2.34 THOUSANDS/ΜL (ref 0.6–4.47)
LYMPHOCYTES NFR BLD AUTO: 43 % (ref 14–44)
MCH RBC QN AUTO: 30.7 PG (ref 26.8–34.3)
MCHC RBC AUTO-ENTMCNC: 32.3 G/DL (ref 31.4–37.4)
MCV RBC AUTO: 95 FL (ref 82–98)
MONOCYTES # BLD AUTO: 0.43 THOUSAND/ΜL (ref 0.17–1.22)
MONOCYTES NFR BLD AUTO: 8 % (ref 4–12)
NEUTROPHILS # BLD AUTO: 2.54 THOUSANDS/ΜL (ref 1.85–7.62)
NEUTS SEG NFR BLD AUTO: 47 % (ref 43–75)
NRBC BLD AUTO-RTO: 0 /100 WBCS
PLATELET # BLD AUTO: 163 THOUSANDS/UL (ref 149–390)
PMV BLD AUTO: 11.3 FL (ref 8.9–12.7)
POTASSIUM SERPL-SCNC: 4.1 MMOL/L (ref 3.5–5.3)
RBC # BLD AUTO: 4.72 MILLION/UL (ref 3.88–5.62)
SODIUM SERPL-SCNC: 142 MMOL/L (ref 136–145)
WBC # BLD AUTO: 5.41 THOUSAND/UL (ref 4.31–10.16)

## 2021-03-31 PROCEDURE — 85027 COMPLETE CBC AUTOMATED: CPT | Performed by: STUDENT IN AN ORGANIZED HEALTH CARE EDUCATION/TRAINING PROGRAM

## 2021-03-31 PROCEDURE — 80048 BASIC METABOLIC PNL TOTAL CA: CPT | Performed by: NURSE PRACTITIONER

## 2021-03-31 PROCEDURE — 99233 SBSQ HOSP IP/OBS HIGH 50: CPT | Performed by: INTERNAL MEDICINE

## 2021-03-31 RX ORDER — FUROSEMIDE 20 MG/1
20 TABLET ORAL DAILY
Qty: 30 TABLET | Refills: 0 | Status: SHIPPED | OUTPATIENT
Start: 2021-04-01 | End: 2021-05-01

## 2021-03-31 RX ORDER — ASPIRIN 81 MG/1
81 TABLET ORAL DAILY
Qty: 30 TABLET | Refills: 0 | Status: SHIPPED | OUTPATIENT
Start: 2021-03-31 | End: 2021-03-31 | Stop reason: SDUPTHER

## 2021-03-31 RX ORDER — POTASSIUM CHLORIDE 1.5 G/1.77G
10 POWDER, FOR SOLUTION ORAL DAILY
Qty: 10 PACKET | Refills: 0 | Status: SHIPPED | OUTPATIENT
Start: 2021-03-31 | End: 2021-04-20

## 2021-03-31 RX ORDER — LANOLIN ALCOHOL/MO/W.PET/CERES
3 CREAM (GRAM) TOPICAL
Status: DISCONTINUED | OUTPATIENT
Start: 2021-03-31 | End: 2021-03-31 | Stop reason: HOSPADM

## 2021-03-31 RX ORDER — ASPIRIN 81 MG/1
81 TABLET ORAL DAILY
Qty: 30 TABLET | Refills: 0 | Status: SHIPPED | OUTPATIENT
Start: 2021-03-31

## 2021-03-31 RX ADMIN — HEPARIN SODIUM 5000 UNITS: 5000 INJECTION INTRAVENOUS; SUBCUTANEOUS at 08:10

## 2021-03-31 RX ADMIN — MELATONIN 3 MG: at 00:09

## 2021-03-31 RX ADMIN — FUROSEMIDE 20 MG: 20 TABLET ORAL at 08:10

## 2021-03-31 RX ADMIN — METOPROLOL TARTRATE 50 MG: 50 TABLET, FILM COATED ORAL at 08:10

## 2021-03-31 RX ADMIN — ASPIRIN 81 MG: 81 TABLET, COATED ORAL at 08:10

## 2021-03-31 RX ADMIN — PANTOPRAZOLE SODIUM 40 MG: 40 TABLET, DELAYED RELEASE ORAL at 06:16

## 2021-03-31 RX ADMIN — HEPARIN SODIUM 5000 UNITS: 5000 INJECTION INTRAVENOUS; SUBCUTANEOUS at 00:10

## 2021-03-31 RX ADMIN — DIBASIC SODIUM PHOSPHATE, MONOBASIC POTASSIUM PHOSPHATE AND MONOBASIC SODIUM PHOSPHATE 1 TABLET: 852; 155; 130 TABLET ORAL at 08:10

## 2021-03-31 RX ADMIN — CLOPIDOGREL BISULFATE 75 MG: 75 TABLET, FILM COATED ORAL at 08:10

## 2021-03-31 NOTE — DISCHARGE SUMMARY
[facility] Discharge Summary - Medical Dinorah Riding 48 y o  male MRN: [id number]    [address] [facility] BE [facility] 6 Room / Bed: [facility] 622/[facility] 840-00 Encounter: [id number]    BRIEF OVERVIEW    Admitting Provider: Mayo Freeman MD  Discharge Provider: Mayo Freeman MD  Primary Care Physician at Discharge: Shelby Stark MD    Discharge To: Home    Admission Date: 3/26/2021     Discharge Date:3/31/2021  Primary Discharge Diagnosis  Principal Problem:    DESHAWN (acute kidney injury) ([facility] )  Active Problems:    Atherosclerotic heart disease of native coronary artery without angina pectoris    Chronic systolic congestive heart failure ([facility] )    Essential (primary) hypertension    Elevated troponin I level  Resolved Problems:    Abdominal pain    Elevated troponin I level  Assessment & Plan  Chronic elevation in troponin btwn 0 12-0  19  Likely non MI related with stable EKG on admission  Essential (primary) hypertension  Assessment & Plan  · Home medications include:  Furosemide 40 mg daily, lisinopril 10 mg daily, Lopressor 50 mg every 12 hours  · Current medications include:  Lopressor 50 mg every 12 hours, Lasix 20mg daily  · Avoid hypotension or fluctuations in blood pressure  · Continue to hold lisinopril 10mg in the setting of DESHAWN until visi to nephrology in one week      Chronic systolic congestive heart failure (HCC)  Assessment & Plan  Wt Readings from Last 3 Encounters:   03/26/21 111 kg (245 lb)   03/24/21 115 kg (253 lb 8 5 oz)   10/13/20 104 kg (230 lb)        Echocardiogram from 14/5869 showed systolic function was severely reduced with ejection fraction estimated to be 20 % with severe diffuse hypokinesis    Has single chamber AICD in place  Seen by cardiology during recent admission at [facility]   Patient appears euvolemic, denies chest pain, shortness of breath, palpitation, headaches, lightheadedness    -Continue metoprolol 50mg BID, plavix, aspirin and statin  -Lasix and lisinopril were on hold due to DESHAWN  - kidney function improving, will start  Lasix 20 mg daily in the a m , continue to hold lisinopril until outpatient follow-up with Nephrology in one week  - continue I&O, weight measurement, cardiac diet 2 g sodium  -Will need to establish outpatient follow up with cardiology Dr Charise Nissen as he is new to the area  Contact information to schedule appointment has been given to patient    * DESHAWN (acute kidney injury) [facility])  Assessment & Plan  Presented with Cr of 1 84 on admission (baseline likely 1 1-1 4) in the setting of poor PO intake due to his abdominal pain  Most recent Creatinine 1 97  worsening kidney function in the setting of CHF with low EF of 20%  Likely pre renal,  FENA 0 2%  He is s/p gentle hydration will with IV fluids with no improvement in kidney function  He appears euvolemic   - Urinalysis with micro 3/28/21 reports:  SG 1 029, large blood,+4 proteinuria, hyaline casts 0-3  Urine sodium-21, Right upper quadrant reveals right kidney measuring 11 1 x 4 7 x 5 5 cm with mid pole right kidney cyst measuring 1 2 x 0 8 x 0 9 cm   Plan  - nephrology consulted recommendation appreciated  - Will continue to hold lisinopril 10 mg -nephrology will evaluate and resume with outpatient appointment  -  home medications: lasix 40mg and lisinopril 10mg were held due to DESHAWN  - Per nephrology, resume lasix 20mg today 3/31/21 since kidney function is improving  -trend Cr/follow-up BMP in one week    Abdominal pain-resolved as of 3/28/2021  Assessment & Plan  Constant epigastric/RUQ abdominal pain that intermittently worsens after eating, laying flat on his back and when he is walking  Likely peptic ulcer disease vs gastritis  Recent RUQ US showed hepatic and R renal cysts, but no signs of cholecystitis      -received mylanta, lidocaine viscous, carafrate and protonix in the ED  -mylanta PRN  -continue protonix 40mg daily  - recommend outpatient colonoscopy for colon cancer screening  -patient now on regular diet    Therapeutic Operative Procedures Performed  None      Discharge Disposition: Home/Self Care  Discharged With Lines: no    Test Results Pending at Discharge: None    Outpatient Follow-Up  Nephrology, Cardiology and PCP  Follow up with consulting providers  Dr Pauleen Cooks, Dr Calvin Ellis, Dr Loyd Pierce Requiring Follow-up   yes     Issue: DESHAWN  Responsible Individual: Dr Calvin Ellis  What is Needed: Follow up  Follow-up Appointments Arranged: Yes           Code Status: Level 1 - Full Code  Advance Directive and Living Will: <no information>  Power of :    POLST:      Medications   See after visit summary for reconciled discharge medications provided to patient and family  Allergies  No Known Allergies  Discharge Diet: cardiac diet  Activity restrictions: none    [address] [facility]  Dennis Davidson is a 49 yo M with hx of HTN, HFrEF with EF of 20% with AICD in place, recent admission to [facility] for abdominal pain on 3/24/21, who presents to [facility] ED today for the same epigastric/RUQ abdominal pain  Pt reports the pain initially started 1 day prior to his admission at [facility] with no significant improvement after being discharged from the hospital  Pt describes the pain as constant with intermittent worsening in severity after eating food, laying flat in his back and with walking  No fever, chills, nausea, vomiting, diarrhea, constipation, blood in stool  No prior history of similar pain  Denies having any metallic taste in his mouth or heart burn sxs  Also without any chest pain, SOB, or palpitations  Patient denied any alcohol consumption, he is on aspirin on a daily basis  RUQ US at [facility] was negative for cholecystitis  Reported to have improvement in symptoms on discharge after receiving PPI  Pt was also seen by Cardiology during that admission due to slight elevation in troponin, which was thought to be due to non MI elevation    No further workup for was recommended as it was downtrending slightly  Pt recently moved to the area from [place] and has yet to establish care with PCP or cardiology  He  Received mylanta, lidocaine viscous, carafrate and protonix in the ED,mylanta PRN and  protonix 40mg daily  Cr of 1 84 on admission (baseline likely 1 1-1 4) in the setting of poor PO intake due to his abdominal pain, he had an DESHAWN and was given IV fluid gentle hydration, his lisinopril and Lasix were held, nephrology was consulted  Upon discharge his Lasix is decreased to 20 mg daily and per Nephrology recommendation will continue to hold lisinopril until outpatient follow-up in 1 week  Patient (aware) also has a follow-up BMP in a week prior to seeing his Nephrology  Today 3/31 creatinine is improved to 1 32  His Abdominal pain is resolved at this time, blood pressure has been fluctuating during the course of this admission but now stable  He had some nonsustained V-tach on telemetry on 03/30 likely secondary to withdrawal from his metoprolol  Patient missed his night time dose of metoprolol due to low blood pressure parameters  He has had no events on tele, patient is stable has no complaints, he denies headaches, lightheadedness, chest pain, shortness of breath, palpitation, abdominal pain, N/V/C/D, numbness tingling sensation of his extremity  Patient is given referral/contact information to Cardiology, PCP and Nephrology upon discharge  He will be picked up by his relative upon discharge      Presenting Problem/History of Present Illness  Principal Problem:    DESHAWN (acute kidney injury) ([facility] )  Active Problems:    Atherosclerotic heart disease of native coronary artery without angina pectoris    Chronic systolic congestive heart failure ([facility] )    Essential (primary) hypertension    Elevated troponin I level  Resolved Problems:    Abdominal pain    Vitals:    03/31/21 0741   BP: 146/82   Pulse: 78   Resp: 16   Temp: 97 8 °F (36 6 °C)   SpO2: 98%     Physical Exam  Vitals signs and nursing note reviewed  Constitutional:       Appearance: He is well-developed  He is obese  HENT:      Head: Normocephalic and atraumatic  Mouth/Throat:      Mouth: Mucous membranes are moist    Eyes:      Extraocular Movements: Extraocular movements intact  Conjunctiva/sclera: Conjunctivae normal    Neck:      Musculoskeletal: Normal range of motion and neck supple  Cardiovascular:      Rate and Rhythm: Normal rate and regular rhythm  Pulses: Normal pulses  Heart sounds: Normal heart sounds  No murmur  Pulmonary:      Effort: Pulmonary effort is normal  No respiratory distress  Breath sounds: Normal breath sounds  Abdominal:      General: Bowel sounds are normal       Palpations: Abdomen is soft  Tenderness: There is no abdominal tenderness  Musculoskeletal: Normal range of motion  General: No swelling or tenderness  Right lower leg: No edema  Left lower leg: No edema  Comments: Bilateral nontender gynecomastia   Skin:     General: Skin is warm and dry  Neurological:      General: No focal deficit present  Mental Status: He is alert and oriented to person, place, and time         Current Discharge Medication List      CONTINUE these medications which have CHANGED    Details   aspirin (ECOTRIN LOW STRENGTH) 81 mg EC tablet Take 1 tablet (81 mg total) by mouth daily  Qty: 30 tablet, Refills: 0    Associated Diagnoses: Atherosclerotic heart disease of native coronary artery without angina pectoris      furosemide (LASIX) 20 mg tablet Take 1 tablet (20 mg total) by mouth daily  Qty: 30 tablet, Refills: 0    Associated Diagnoses: CHF (congestive heart failure) ([facility])      potassium chloride (KLOR-CON) 20 mEq packet Take 10 mEq by mouth daily for 20 days  Qty: 10 packet, Refills: 0    Associated Diagnoses: CHF (congestive heart failure) ([address] )           Current Discharge Medication List      CONTINUE these medications which have NOT CHANGED    Details   atorvastatin (LIPITOR) 10 mg tablet Take 1 tablet (10 mg total) by mouth daily with dinner  Qty: 30 tablet, Refills: 0    Associated Diagnoses: Atherosclerotic heart disease of native coronary artery without angina pectoris      clopidogrel (PLAVIX) 75 mg tablet Take 1 tablet (75 mg total) by mouth daily  Qty: 30 tablet, Refills: 0    Associated Diagnoses: Atherosclerotic heart disease of native coronary artery without angina pectoris      famotidine (PEPCID) 20 mg tablet Take 1 tablet (20 mg total) by mouth daily for 14 days  Qty: 14 tablet, Refills: 0    Associated Diagnoses: Abdominal pain      metoprolol tartrate (LOPRESSOR) 50 mg tablet Take 1 tablet (50 mg total) by mouth every 12 (twelve) hours  Qty: 60 tablet, Refills: 0    Associated Diagnoses: Atherosclerotic heart disease of native coronary artery without angina pectoris           Current Discharge Medication List        Other Pertinent Test Results  None    Discharge Condition: stable      Discharge  Statement   I spent 30 minutes minutes discharging the patient  This time was spent on the day of discharge  I had direct contact with the patient on the day of discharge  Additional documentation is required if more than 30 minutes were spent on discharge

## 2021-03-31 NOTE — DISCHARGE INSTRUCTIONS
Acute Kidney Injury (DESHAWN)  You have been diagnosed with Acute Kidney Injury (DESHAWN)  The following information has been developed to provide you with information about DESHAWN and treatment  What is Acute Kidney Injury (DESHAWN)? DESHAWN occurs when kidney function decreases over a short period of time  This condition causes a buildup of waste products in the blood and can cause fluid to build up causing swelling in the legs and shortness of breath  Sometimes called Acute Kidney Failure or Acute Renal Failure, DESHAWN is often reversible if it is found and treated quickly  How do you know if you have DESHAWN? DESHAWN is diagnosed by assessing kidney function  This is done by obtaining a blood test to measure the blood level of creatinine  Decreased urine output can sometimes also indicate DESHAWN  Who is at risk for DESHAWN? DESHAWN can happen to anyone but usually happens to people who are already sick and may be in the hospital  People are at higher risk for DESHAWN if they have any of the following:   age 72 years or older   high or low blood pressure   underlying kidney disease (e g , Chronic Kidney Disease (CKD)   peripheral vascular disease (hardening of arteries)   chronic diseases such as liver disease, heart disease and diabetes   a single kidney    What are the symptoms of DESHAWN? You may or may not have the symptoms to suggest you have kidney injury until the DESHAWN has progressed  Some of the symptoms are listed below:   Not making enough urine   Increased swelling in legs    Feeling tired   Trouble breathing or shortness of breath   Nausea   New or worsening confusion    What causes DESHAWN?   The causes are divided into three categories:   Not enough blood flowing to the kidneys (e g , low blood pressure, bleeding, diarrhea, dehydration)   Injury directly to the kidneys (e g , blood clots, severe infections such as sepsis, medicine toxicity, IV contrast dye used for cardiac catheterization or CT scans)   Blockage to the tubes (ureters) that drain the urine from the kidneys (e g , enlarged prostate, kidney stones, blood clots)    What is the treatment for DESHAWN? The treatment for DESHAWN depends on correcting what caused it  Treatment usually involves removing the cause and measures to prevent further injury to the kidneys  This may require the use of intravenous fluids or medications and/or temporary dialysis  Dialysis is a process using a machine that does the job of the kidneys to remove waste and help correct the electrolyte and fluid balance while the kidneys are recovering  If dialysis is needed to treat DESHAWN, the doctor will assess daily to see if the kidneys are showing signs of recovery  The daily assessments determine how long dialysis needs to continue  Depending on the cause and the extent of damage, an episode of DESHAWN may resolve in a few days to several weeks to several months  What are the long term effects of having an episode of DESHAWN?  People who have one episode of DESHAWN are at an increased risk of having another episode of DESHAWN as well as other health problems such as kidney disease, stroke, and heart disease   In a small number of people who had unrecognized kidney disease, an DESHAWN episode may result in Chronic Kidney Disease (CKD) which requires lifelong monitoring and treatment  How do you prevent future episodes of DESHAWN?  Make sure to follow up with the kidney doctor after hospital discharge and obtain blood work to reassess and monitor kidney function     If you have diabetes, keep your blood sugar in goal range and keep appointments with your diabetes specialist    Felicity Lilly If you have high blood pressure, have your blood pressure checked regularly to make sure it is in target range  o If you take blood pressure medicine called ACEIs or ARBs (e g , Lisinopril, Enalapril, Diovan, Losartan), your doctor may tell you to skip a dose or two if you have severe dehydration and your blood pressure is running low    Avoid using medicines such as NSAIDs (Nonsteroidal Anti-inflammatory Drugs) and Toribio-2 Inhibitors (a type of NSAID) that may be harmful to kidney function  These may include the medicines listed in the table that follows  Examples of NSAIDS and Toribio-2 Inhibitors   Talk to your doctor or healthcare provider before stopping any medicine ordered for you  Celecoxib (CELEBREX) Ketoprofen (ORUDIS, ORUVAIL)   Diclofenac (VOLTAREN, CATAFLAM) Ketorolac (TORADOL)   Diflunisal (DOLOBID) Meloxicam (MOBIC)   Etodolac (LODINE) Nabumetone (RELAFEN)   Fenoprofen (NALFON) Naproxen (ALEVE, NAPROSYN,    NAPRELAN, ANAPROX)   Flurbiprofen (ANSAID) Oxaprozin (DAYPRO)   Ibuprofen (MOTRIN, ADVIL) Piroxicam (FELDENE)   Indomethacin (INDOCIN) Sulindac (CLINORIL)    Tolmetin (Eligah Hones)     Is there a special diet for people with DESHAWN? People with DESHAWN and/or other kidney disease often have high potassium and phosphorus levels in their blood  To protect the kidneys from further injury and to avoid complications, most doctors recommend following a healthy diet choosing foods low potassium and low phosphorus   Limiting dietary potassium to 2 5 grams/day and phosphorus to 800 milligrams/day is recommended   A dietitian can help you with learning more about this type of diet   The tables on the following page may help you to choose lower potassium and phosphorus foods  The following websites are also good sources of information:  [employer] at  org/nutrition/Kidney-Disease-Stages-1-4   [employer]   https://www niddk nih gov/health-information/kidney-disease/chronic-kidney-disease-ckd/eating-nutrition  https://[facility] org/health/articles/15641-renal-diet-basics  [employer] com cy    If you have any questions or concerns about your condition, please contact your doctor or healthcare provider    These tables may help you to choose lower potassium and phosphorus foods    AVOID these higher phosphorus* foods: CHOOSE these lower phosphorus* foods:   Milk, pudding , yogurt made from animals and from many soy varieties Rice milk (unfortified), nondairy creamer   Hard cheeses, ricotta, cottage cheese, fat-free cream cheese Regular and low-fat cream cheese   Ice cream, frozen yogurt Sherbet, frozen fruit pops, sorbet   Soups made with milk, dried peas, beans, lentils or other high phosphorus ingredients Soups made with broth, are water-based, or other lower phosphorus ingredients   Whole grains, including whole-grain breads, crackers, cereal, rice and pasta, corn tortillas Refined grains, including white bread, crackers, cereals, rice and pasta   Quick breads, biscuits, cornbread, muffins, pancakes, waffles, granola, wheat germ Homemade refined (white) dinner rolls, bagels, English muffins, sugar cookies, shortbread cookies, andrea food cake   Dried peas (split, black-eyed), beans (black, garbanzo, lima, kidney, navy, cruz), lentils Green peas (canned, frozen), green beans, wax beans   Organ meats, walleye, [place], sardines Lean beef, pork, lamb, poultry, other fish   Nuts and seeds Popcorn   Peanut butter, other nut butters; tofu, veggie or soy burgers Jam, jelly, honey   Chocolate, including chocolate drinks Carob (chocolate-flavored) candy, hard candy,  gumdrops   [place], pepper-type sodas, flavored ireland, bottled teas, beer Lemon-lime soda, ginger ale or root beer, plain water, cream soda, grape soda   *Always read labels and avoid foods with ingredients containing "phos"  AVOID these higher potassium foods: CHOOSE these lower potassium foods:      Milk (fat free, low fat, whole, buttermilk, Soy), yogurt    Regular and low-fat cream cheese      Beans (white, [place]), [place] sprouts, spinach Swiss       chard, broccoli, avocado, artichoke, potatoes, sweet      potatoes, tomatoes/tomato sauce, beet greens      Green beans, alfalfa sprouts, bamboo shoots (canned),       cabbage, carrots, cauliflower, corn, cucumber, eggplant,      endive, lettuce, mushrooms, onions, radishes,  watercress,       water chestnuts (canned), rice, peas      Halibut, tuna, cod, snapper, tuna fish, turkey    Egg, lean beef, pork, lamb, shellfish, chicken       Banana, papaya, orange, cantaloupe, dates, raisins and      other dried fruit, pomegranate, avocado      Apple, applesauce, blackberries, raspberries, pears,     watermelon, cucumbers, blueberries, cranberries,       peaches      Almonds, peanuts, hazelnuts, [place], cashew, mixed,      seeds (sunflower, pumpkin)     Homemade refined (white) dinner rolls, bagels,      English muffins, flour tortilla, crackers, boyd      crackers, popcorn, pretzels, spaghetti or macaroni,       hummus      Tomato or vegetable juice, prune juice    Papaya, abdoulaye, or pear nectar, cranberry juice cocktail      Molasses

## 2021-03-31 NOTE — DISCHARGE INSTR - AVS FIRST PAGE
Call to schedule all your appointments as discussed and listed  Do not take Lisinopril until you see your nephrologist  Lasix has been decreased to 20mg daily  Potassium pills have been reduced to 10mg  Continue this dose until you see your nephrologist  [place] to do your lab work before you see the nephrologist in one week

## 2021-03-31 NOTE — PLAN OF CARE
Problem: GASTROINTESTINAL - ADULT  Goal: Maintains or returns to baseline bowel function  Description: INTERVENTIONS:  - Assess bowel function  - Encourage oral fluids to ensure adequate hydration  - Administer IV fluids if ordered to ensure adequate hydration  - Administer ordered medications as needed  - Encourage mobilization and activity  - Consider nutritional services referral to assist patient with adequate nutrition and appropriate food choices  Outcome: Progressing  Goal: Maintains adequate nutritional intake  Description: INTERVENTIONS:  - Monitor percentage of each meal consumed  - Identify factors contributing to decreased intake, treat as appropriate  - Assist with meals as needed  - Monitor I&O, weight, and lab values if indicated  - Obtain nutrition services referral as needed  Outcome: Progressing     Problem: GENITOURINARY - ADULT  Goal: Maintains or returns to baseline urinary function  Description: INTERVENTIONS:  - Assess urinary function  - Encourage oral fluids to ensure adequate hydration if ordered  - Administer IV fluids as ordered to ensure adequate hydration  - Administer ordered medications as needed  - Offer frequent toileting  - Follow urinary retention protocol if ordered  Outcome: Progressing     Problem: METABOLIC, FLUID AND ELECTROLYTES - ADULT  Goal: Electrolytes maintained within normal limits  Description: INTERVENTIONS:  - Monitor labs and assess patient for signs and symptoms of electrolyte imbalances  - Administer electrolyte replacement as ordered  - Monitor response to electrolyte replacements, including repeat lab results as appropriate  - Instruct patient on fluid and nutrition as appropriate  Outcome: Progressing     Problem: SKIN/TISSUE INTEGRITY - ADULT  Goal: Skin integrity remains intact  Description: INTERVENTIONS  - Identify patients at risk for skin breakdown  - Assess and monitor skin integrity  - Assess and monitor nutrition and hydration status  - Monitor labs (i e  albumin)  - Assess for incontinence   - Turn and reposition patient  - Assist with mobility/ambulation  - Relieve pressure over bony prominences  - Avoid friction and shearing  - Provide appropriate hygiene as needed including keeping skin clean and dry  - Evaluate need for skin moisturizer/barrier cream  - Collaborate with interdisciplinary team (i e  Nutrition, Rehabilitation, etc )   - Patient/family teaching  Outcome: Progressing     Problem: SKIN/TISSUE INTEGRITY - ADULT  Goal: Skin integrity remains intact  Description: INTERVENTIONS  - Identify patients at risk for skin breakdown  - Assess and monitor skin integrity  - Assess and monitor nutrition and hydration status  - Monitor labs (i e  albumin)  - Assess for incontinence   - Turn and reposition patient  - Assist with mobility/ambulation  - Relieve pressure over bony prominences  - Avoid friction and shearing  - Provide appropriate hygiene as needed including keeping skin clean and dry  - Evaluate need for skin moisturizer/barrier cream  - Collaborate with interdisciplinary team (i e  Nutrition, Rehabilitation, etc )   - Patient/family teaching  Outcome: Progressing

## 2021-03-31 NOTE — PROGRESS NOTES
Progress Note - Nephrology   Siria Adjutant 48 y o  male MRN: [id number]  Unit/Bed#: [facility] 622-01 Encounter: [id number]    ASSESSMENT and PLAN:  Acute kidney injury (POA):  Etiology: Suspect prerenal with diuretic use, relative hypotension and perturbations of blood pressure and loss of autoregulatory system with the use of ACE-inhibitor  · After review of medical records through [address] [place] [address] it appears that the patient has a baseline Creatinine of 1 1-1 4 dating back to 2016  · Lisinopril remains on hold-continue to hold until seen at outpatient hospital follow-up  · Patient was admitted with a creatinine of 1 52  · Peak creatinine 1 97 3/28/2021  · Patient's creatinine today 1 34 with stable electrolytes and acid base  · Clinically, patient is not uremic and there is no acute indication for renal replacement therapy (dialysis)  Workup:  · Urinalysis with micro 3/28/21 reports:  SG 1 029, large blood,+4 proteinuria, hyaline casts 0-3  · Urine sodium-21  · FeNa-0 17%  · UPCR 5 82  · Bladder scan 48 ml  · Right upper quadrant reveals right kidney measuring 11 1 x 4 7 x 5 5 cm with mid pole right kidney cyst measuring 1 2 x 0 8 x 0 9 cm  Plan:  · Avoid nephrotoxins, adjust meds to appropriate GFR  · Optimize hemodynamic status to avoid delay in renal recovery    · Continue to hold lisinopril 10 mg -will evaluate and resume with outpatient appointment  · Lasix 20 mg PO starting today  · Will monitor I/O, lab values volume status  · Check daily weights-no weights obtain     Suspect Chronic kidney disease II/IIIA:    Etiology:  Suspect secondary to hypertension the setting of decreased EF of 20%, now with proteinuria  Assessment and Plan:  · After review of medical records through [facility] and Care everywhere it appears that the patient has a baseline Creatinine of 1 1-1 4  · Does not follow with Nephrology as outpatient  · Patient for hospital follow up appt on 4/8/2021 at [facility] with Dr Leigh Ojeda     Blood pressure/Hypertension:  BP less fluctuating and more stable  Assessment and Plan:  · Previous home medications include:  Furosemide 40 mg daily, lisinopril 10 mg daily, Lopressor 50 mg every 12 hours  · Current medications include:  Lopressor 50 mg every 12 hours, Lasix 20 mg to start today  · Maximize hemodynamics to maintain MAP >65  · Avoid hypotension or fluctuations in blood pressure  · As above, continue to hold lisinopril until evaluated in office on 4/8/2021     Proteinuria:  Suspect secondary to hypertension as well in the setting of DESHAWN  ? Nephrotic range proteinuria with UPCR 5 82  ? With improving creatinine, will repeat UPCR in steady state as outpatient     Acid-base: current bicarb 23  Assessment and Plan:  ? No change in current regimen     Electrolytes:  Within normal limits  Assessment and Plan:  ? No change in current regimen     Other medical Issues:  Abdominal pain:  Resolved-patient denies abdominal pain, nausea vomiting  ? Per primary team  Chronic systolic congestive heart failure:  Status post AICD  ? Prior echocardiogram 10/14/2020 revealed EF of 20% with severe diffuse hypokinesis  ? As above lisinopril remains on hold  ? As above Lasix 20 mg to start today-half dose from previous outpatient regimen  ? Patient examining euvolemic  ? Will continue trend for now    Disposition:  Renal function continues to improve and back to baseline  Okay for discharge from renal standpoint patient has return follow-up appointment on 04/08/2021 at 4 pm at [facility] office  BMP ordered for next week    SUBJECTIVE:  Patient seen and examined at bedside  Denies chest pain shortness of breath    Overall feeling well    OBJECTIVE:  Current Weight: Weight - Scale: 111 kg (245 lb)  Vitals:    03/31/21 0001 03/31/21 0244 03/31/21 0400 03/31/21 0741   BP: 102/67 118/80  146/82   BP Location:    Right arm   Pulse: 81 80  78   Resp: 19 20  16   Temp:  (!) 97 2 °F (36 2 °C)  97 8 °F (36 6 °C) TempSrc:    Oral   SpO2: (!) 89% 90% 94% 98%   Weight:       Height:         No intake or output data in the 24 hours ending 03/31/21 1032  General:  No acute distress, cooperative, lying flat  Skin:  Warm and dry without rash  ENMT:  Mucous membranes moist, sclera anicteric  Neck:  Supple without JVD  Respiratory:  Essentially clear on auscultation without crackles, rhonchi, wheezes  Cardiac:  Regular rate and rhythm without rub, or murmur  GI:  Soft, nontender, no distention, active bowel sounds  Extremities:  Trace edema noted bilaterally  Neuro:  Alert oriented and awake  Psych:  Appropriate affect    Medications:    Current Facility-Administered Medications:     acetaminophen (TYLENOL) tablet 650 mg, 650 mg, Oral, Q6H PRN, [facility],     aspirin (ECOTRIN LOW STRENGTH) EC tablet 81 mg, 81 mg, Oral, Daily, [facility], DO, 81 mg at 03/31/21 0810    atorvastatin (LIPITOR) tablet 10 mg, 10 mg, Oral, Daily With Aviva Flank, DO, 10 mg at 03/30/21 1742    clopidogrel (PLAVIX) tablet 75 mg, 75 mg, Oral, Daily, [facility], DO, 75 mg at 03/31/21 0810    furosemide (LASIX) tablet 20 mg, 20 mg, Oral, Daily, VERONICA Hollis, 20 mg at 03/31/21 0810    heparin (porcine) subcutaneous injection 5,000 Units, 5,000 Units, Subcutaneous, Q8H [facility], 5,000 Units at 03/31/21 0810 **AND** [CANCELED] Platelet count, , , Once, Triage Protocol Emergency, MD    melatonin tablet 3 mg, 3 mg, Oral, HS PRN, Opal Session, DO, 3 mg at 03/31/21 0009    metoprolol tartrate (LOPRESSOR) tablet 50 mg, 50 mg, Oral, Q12H [facility], [facility], DO, 50 mg at 03/31/21 0810    pantoprazole (PROTONIX) EC tablet 40 mg, 40 mg, Oral, Early Morning, Irl MD Melissa, 40 mg at 03/31/21 0616    potassium-sodium phosphateS (K-PHOS,PHOSPHA 250) -250 mg tablet 1 tablet, 1 tablet, Oral, TID, Helen Cowan MD, 1 tablet at 03/31/21 1192    Laboratory Results:  Results from last 7 days   Lab Units 03/31/21  0520 03/30/21  3807 03/30/21  0529 03/29/21  1430 03/28/21  0852 03/27/21  0628 03/26/21 2039   WBC Thousand/uL 5 41  --  5 31 4 74 5 53 5 93 5 56   HEMOGLOBIN g/dL 14 5  --  14 4 14 3 14 6 14 4 14 3   HEMATOCRIT % 44 9  --  44 0 43 8 44 5 43 9 44 5   PLATELETS Thousands/uL 163  --  147* 162 151 164 176   SODIUM mmol/L 142  --  140 138 138 136 137   POTASSIUM mmol/L 4 1  --  4 0 4 0 4 6 4 6 4 4   CHLORIDE mmol/L 112*  --  110* 108 107 106 107   CO2 mmol/L 23  --  24 23 24 23 23   BUN mg/dL 19  --  22 25 27* 21 18   CREATININE mg/dL 1 34*  --  1 50* 1 79* 1 97* 1 94* 1 84*   CALCIUM mg/dL 8 2*  --  8 2* 8 1* 7 8* 8 4 8 4   MAGNESIUM mg/dL  --  2 0  --   --   --   --   --    PHOSPHORUS mg/dL  --  2 4*  --   --   --   --   --

## 2021-04-02 LAB
ATRIAL RATE: 109 BPM
P AXIS: 60 DEGREES
PR INTERVAL: 182 MS
QRS AXIS: -42 DEGREES
QRSD INTERVAL: 105 MS
QT INTERVAL: 351 MS
QTC INTERVAL: 513 MS
T WAVE AXIS: 45 DEGREES
VENTRICULAR RATE: 128 BPM

## 2021-04-02 PROCEDURE — 93010 ELECTROCARDIOGRAM REPORT: CPT | Performed by: INTERNAL MEDICINE

## 2021-04-02 NOTE — UTILIZATION REVIEW
Notification of Inpatient Admission/Inpatient Authorization Request   This is a Notification of Inpatient Admission for [address]  Be advised that this patient was admitted to our facility under Inpatient Status  Contact Dov Merino at [phone] for additional admission information  [address] [facility] [address] [place] DEPT  DEDICATED [phone]  Patient Name:   Katie Macias   YOB: 1975       [address]   [address]:   [address] [facility]  Tax ID: [id number]  NPI: [id number] Attending Provider/NPI:  Phone:  Address: Alphonsus Meigs, [address] [facility]  [phone]  Same as the facility   Place of Service Code: 24 [facility]   Start Date: 3/24/21 0437 Discharge Date & Time: 3/24/2021 11:45 AM    Type of Admission: Inpatient Status Discharge Disposition   (if discharged): Home/Self Care   Patient Diagnoses: CHF (congestive heart failure) ([address] ) [I50 9]  Abdominal pain [R10 9]  DESHAWN (acute kidney injury) ([facility] ) [V38 2]  Chronic systolic congestive heart failure ([facility] ) [I50 22]  Elevated troponin I level [R77 8]   Orders: Admission Orders (From admission, onward)     Ordered        03/24/21 0437  Inpatient Admission  Once                    Assigned Utilization Review Contact: Dov Merino  Utilization   Network Utilization Review Department  Phone: [phone]; Fax [phone]  Email: [email or web address]   ATTENTION PAYERS: Please call the assigned Utilization  directly with any questions or concerns ALL voicemails in the department are confidential  Send all requests for admission clinical reviews, approved or denied determinations and any other requests to dedicated fax number belonging to the campus where the patient is receiving treatment        Admitted Inpatient 3/24/21 at [phone]  Discharge Order written 3/24/21 at [phone]      Initial Clinical Review    Admission: Date/Time/Statement:   Admission Orders (From admission, onward)     Ordered        03/24/21 0437  Inpatient Admission  Once                Orders Placed This Encounter   Procedures    Inpatient Admission     Standing Status:   Standing     Number of Occurrences:   1     Order Specific Question:   Level of Care     Answer:   Med Surg [16]     Order Specific Question:   Bed Type     Answer:   Cal [4]     Order Specific Question:   Bed request comments     Answer:   tele     Order Specific Question:   Estimated length of stay     Answer:   More than 2 Midnights     Order Specific Question:   Certification     Answer:   I certify that inpatient services are medically necessary for this patient for a duration of greater than two midnights  See H&P and MD Progress Notes for additional information about the patient's course of treatment  ED Arrival Information     Expected Arrival Acuity Means of Arrival Escorted By Service Admission Type    - 3/24/2021 01:41 Urgent Walk-In Self Hospitalist Urgent    Arrival Complaint    Abdominal Pain     Chief Complaint   Patient presents with    Abdominal Pain     reports mid upper epigastric pain that does not radiate  pt reports hurts when he eats      Assessment/Plan:  48year old male with PMHx HTN, HLD, Cardiac Arrest s/p ICD, CHF, GERD  Presented urgently to [facility] ED on 3/24/21 2nd 1 day history of epigastric pain with radiation to the right upper quadrant abdomen, associated with acid reflux and exacerbated by food intake  In ED -  HR 58  /120  Exam + epigastric + RUQ abdominal tenderness, mild lower extremity edema  Ultrasound negative for cholelithiasis or cholecystitis, however patient has hepatic and right renal cysts   Chest X ray with small left pleural effusion   EKG showed no acute ischemia or infarction    Labs:  CBC wnl  K+ 3 5  BUN/ Creat 14/ 1 52  Troponin 0 13  T Bili 1 27  BNP 8,346    ED Tx: IVF NSS< IV Toradol, IV Zofran, clonidine x 1 with improvement in BP, Ihsan  Admitted Inpatient 3/24/21 at 0437 2nd epigastric + abdominal pain - Plan:  IV pantoprazole 40 mg bid, Cardiology  +   GI Consults    3/24/21 Cardiology Consult: Active Problems:    CHF (congestive heart failure) (HCC)    Essential (primary) hypertension    Mixed hyperlipidemia    Elevated troponin I level    DESHAWN (acute kidney injury) (HCC)     Non MI elevation in troponin, His symptoms do sound GI in nature, not cardiac  He was likely dehydrated on admission, in the setting of recent PO intake while still on his diuretic  He appears to have stable volume status currently      Suspect this cardiomyopathy is nonischemic based on the reported results of the prior cardiac cath (distal OM disease), would not explain the degree of LV dysfunction he has  AICD is in place  NSVT noted on tele from ER (currently doesn't want to be on tele), likely in setting of some hypokalemia (being repleted)      Plan:  Recommend continuing his stable heart failure therapy of lisinopril, metoprolol  If his creatinine is normalized, would continue his usual home PO lasix dose, as he was able to eat without a problem today    Needs to establish with cardiology, He is contemplating, but can follow with us in the [facility] office    Will need to establish with device clinic as well, he is not sure of the company but we can get this info in the future    Non MI elevation in troponin, no need for further workup of this, stop trending as decreasing    OK for any procedures needed from a GI standpoint, if any    Also stable for discharge from cardiac standpoint if otherwise no additional evaluation planned       ED Triage Vitals   Temperature Pulse Respirations Blood Pressure SpO2   03/24/21 0151 03/24/21 0153 03/24/21 0153 03/24/21 0153 03/24/21 0153   98 1 °F (36 7 °C) 58 18 (!) 159/120 99 %      Temp Source Heart Rate Source Patient Position - Orthostatic VS BP Location FiO2 (%)   03/24/21 0151 03/24/21 0153 03/24/21 0153 03/24/21 0153 --   Oral Monitor Sitting Left arm       Pain Score       03/24/21 0153       7          Wt Readings from Last 1 Encounters:   03/26/21 111 kg (245 lb)     Additional Vital Signs:   03/24/21 0908  --  86  --  114/63  --  --  --  --   03/24/21 0735  96 3 °F (35 7 °C)Abnormal   88  18  102/55  72  98 %  None (Room air)  Lying   03/24/21 0600  --  --  --  --  --  96 %  None (Room air)  --   03/24/21 0441  98 3 °F (36 8 °C)  100  22  122/98   --  100 %  None (Room air)  Lying   BP: right arm/manual at 03/24/21 0441   03/24/21 0340  --  --  --  142/98  --  98 %  None (Room air)  Lying   03/24/21 0324  --  97  20  119/98  --  98 %  --  --   03/24/21 0300  --  96  22  95/67  --  96 %  None (Room air)  Lying   03/24/21 0241  --  106Abnormal   --  117/100  --  99 %  None (Room air)  --   03/24/21 0222  --  --  --  159/120Abnormal   --  --  --       Pertinent Labs/Diagnostic Test Results:     Results from last 7 days   Lab Units 03/31/21  0520 03/30/21  0529 03/29/21  1430 03/28/21  0852 03/27/21  0628 03/26/21  2039   WBC Thousand/uL 5 41 5 31 4 74 5 53 5 93 5 56   HEMOGLOBIN g/dL 14 5 14 4 14 3 14 6 14 4 14 3   HEMATOCRIT % 44 9 44 0 43 8 44 5 43 9 44 5   PLATELETS Thousands/uL 163 147* 162 151 164 176   NEUTROS ABS Thousands/µL 2 54  --   --   --   --  3 55     Results from last 7 days   Lab Units 03/31/21  0520 03/30/21  0939 03/30/21  0529 03/29/21  1430 03/28/21  0852 03/27/21  0628   SODIUM mmol/L 142  --  140 138 138 136   POTASSIUM mmol/L 4 1  --  4 0 4 0 4 6 4 6   CHLORIDE mmol/L 112*  --  110* 108 107 106   CO2 mmol/L 23  --  24 23 24 23   ANION GAP mmol/L 7  --  6 7 7 7   BUN mg/dL 19  --  22 25 27* 21   CREATININE mg/dL 1 34*  --  1 50* 1 79* 1 97* 1 94*   EGFR ml/min/1 73sq m 72  --  63 51 45 46   CALCIUM mg/dL 8 2*  --  8 2* 8 1* 7 8* 8 4   MAGNESIUM mg/dL  --  2 0  --   --   --   --    PHOSPHORUS mg/dL  --  2 4*  --   --   --   --      Results from last 7 days   Lab Units 03/29/21  1430 03/27/21  0628 03/26/21 2039   AST U/L 115* 107* 90*   ALT U/L 147* 107* 98*   ALK PHOS U/L 70 66 66   TOTAL PROTEIN g/dL 6 6 7 0 7 4   ALBUMIN g/dL 2 1* 2 5* 2 7*   TOTAL BILIRUBIN mg/dL 0 78 1 48* 1 31*     Results from last 7 days   Lab Units 03/31/21  0520 03/30/21  0529 03/29/21  1430 03/28/21  0852 03/27/21  0628 03/26/21 2039   GLUCOSE RANDOM mg/dL 105 90 139 84 104 127         Results from last 7 days   Lab Units 03/27/21  0628 03/27/21  0303 03/26/21  2352 03/26/21 2039   TROPONIN I ng/mL 0 20* 0 21* 0 18* 0 19*             Results from last 7 days   Lab Units 03/26/21 2039   LIPASE u/L 108        CHEST X RAY  Probable small left pleural effusion   Relative increased parenchymal density at the lung bases could represent atelectasis or developing pneumonia  Mild cardiomegaly  RIGHT UPPER QUADRANT ULTRASOUND  1   No evidence of cholelithiasis or cholecystitis  2   Hepatic and right renal cysts       ED Treatment:   Medication Administration from 03/24/2021 0141 to 03/24/2021 0518       Date/Time Order Dose Route Action     03/24/2021 0224 ondansetron (ZOFRAN) injection 4 mg 4 mg Intravenous Given     03/24/2021 0227 ketorolac (TORADOL) injection 30 mg 30 mg Intravenous Given     03/24/2021 0216 sodium chloride 0 9 % infusion 125 mL/hr Intravenous New Bag     03/24/2021 0222 cloNIDine (CATAPRES) tablet 0 2 mg 0 2 mg Oral Given     03/24/2021 0409 Lidocaine Viscous HCl (XYLOCAINE) 2 % mucosal solution 15 mL 15 mL Swish & Swallow Given     03/24/2021 0410 aluminum-magnesium hydroxide-simethicone (MYLANTA) oral suspension 30 mL 30 mL Oral Given     Past Medical History:   Diagnosis Date    [facility]     Hypertension     Pacemaker      Present on Admission:   (Resolved) Abdominal pain   DESHAWN (acute kidney injury) ([facility] )   CHF (congestive heart failure) [facility]   Essential (primary) hypertension   Mixed hyperlipidemia    Admitting Diagnosis: CHF (congestive heart failure) (HCC) [I50 9]  Abdominal pain [R10 9]  DESHAWN (acute kidney injury) ([facility] ) [K38 3]  Chronic systolic congestive heart failure ([facility] ) [I50 22]  Elevated troponin I level [R77 8]    Age/Sex: 48 y o  male    Admission Orders:  Telemetry  VS q4hrs  Continuous Pulse Oximetry  Up + OOB as tolerated  Diet Cardiovascular; Cardiac  Daily weight  I+O q shift  Serial Troponin q3hrs x 2    Scheduled Medications:  Medications 03/23 03/24   aluminum-magnesium hydroxide-simethicone (MYLANTA) oral suspension 30 mL   Dose: 30 mL  Freq: Once Route: PO  Start: 03/24/21 0415 End: 03/24/21 0410     0410        aspirin (ECOTRIN LOW STRENGTH) EC tablet 81 mg   Dose: 81 mg  Freq: Daily Route: PO  Start: 03/24/21 0900 End: 03/24/21 1345    Admin Instructions:   Do Not Crush - Enteric coated  0460     1345-D/C'd      atorvastatin (LIPITOR) tablet 10 mg   Dose: 10 mg  Freq: Daily with dinner Route: PO  Start: 03/24/21 1630 End: 03/24/21 1345     1345-D/C'd      cloNIDine (CATAPRES) tablet 0 2 mg   Dose: 0 2 mg  Freq: Once Route: PO  Start: 03/24/21 0230 End: 03/24/21 0222     0222        clopidogrel (PLAVIX) tablet 75 mg   Dose: 75 mg  Freq: Daily Route: PO  Start: 03/24/21 0900 End: 03/24/21 1345     0908     1345-D/C'd      enoxaparin (LOVENOX) subcutaneous injection 40 mg   Dose: 40 mg  Freq: Daily Route: SC  Start: 03/24/21 0900 End: 03/24/21 0559     0559-D/C'd      furosemide (LASIX) tablet 20 mg   Dose: 20 mg  Freq: Daily Route: PO  Start: 03/24/21 0900 End: 03/24/21 0534     0534-D/C'd      heparin (porcine) subcutaneous injection 5,000 Units   Dose: 5,000 Units  Freq: Every 8 hours scheduled Route: SC  Start: 03/24/21 0600 End: 03/24/21 1345     0609     1345-D/C'd      ketorolac (TORADOL) injection 30 mg   Dose: 30 mg  Freq: Once Route: IV  Start: 03/24/21 0215 End: 03/24/21 0227     0227        Lidocaine Viscous HCl (XYLOCAINE) 2 % mucosal solution 15 mL   Dose: 15 mL  Freq:  Once Route: SWISH & SWAL  Start: 03/24/21 0520 End: 03/24/21 0409     0409        lisinopril (ZESTRIL) tablet 10 mg   Dose: 10 mg  Freq: Daily Route: PO  Start: 03/24/21 0900 End: 03/24/21 0612     0612-D/C'd      metoprolol tartrate (LOPRESSOR) tablet 50 mg   Dose: 50 mg  Freq: Every 12 hours scheduled Route: PO  Start: 03/24/21 0900 End: 03/24/21 1345     0908     1345-D/C'd      ondansetron (ZOFRAN) injection 4 mg   Dose: 4 mg  Freq: Once Route: IV  Start: 03/24/21 0215 End: 03/24/21 0224     0224        pantoprazole (PROTONIX) injection 40 mg   Dose: 40 mg  Freq: Every 12 hours scheduled Route: IV  Start: 03/24/21 0530 End: 03/24/21 1345     0605     1345-D/C'd      potassium chloride (K-DUR,KLOR-CON) CR tablet 20 mEq   Dose: 20 mEq  Freq: Once Route: PO  Start: 03/24/21 0600 End: 03/24/21 0609     0609        potassium chloride (K-DUR,KLOR-CON) CR tablet 40 mEq   Dose: 40 mEq  Freq:  Once Route: PO  Start: 03/24/21 0815 End: 03/24/21 0908     0908        Medications 03/23 03/24     Continuous IV Infusions:  IVF sodium chloride 0 9 % infusion     Ordered Dose: 125 mL/hr Route: Intravenous Frequency: Continuous @ 125 mL/hr   Scheduled Start Date/Time: 03/24/21 0215        PRN Medications:  Medications 03/24   acetaminophen (TYLENOL) tablet 650 mg   Dose: 650 mg  Freq: Every 4 hours PRN Route: PO  PRN Reason: mild pain  Start: 03/24/21 0519 End: 03/24/21 1345    1345-D/C'd      aluminum-magnesium hydroxide-simethicone (MYLANTA) oral suspension 30 mL   Dose: 30 mL  Freq: Every 6 hours PRN Route: PO  PRN Reasons: indigestion,heartburn  Start: 03/24/21 0519 End: 03/24/21 1345    1345-D/C'd      bisacodyl (DULCOLAX) rectal suppository 10 mg   Dose: 10 mg  Freq: Daily PRN Route: RE  PRN Reason: constipation  Start: 03/24/21 0519 End: 03/24/21 1345    1345-D/C'd      HYDROmorphone (DILAUDID) injection 0 5 mg   Dose: 0 5 mg  Freq: Every 1 hour PRN Route: IV  PRN Reason: breakthrough pain  Start: 03/24/21 0519 End: 03/24/21 1345    1345-D/C'd      nitroglycerin (NITROSTAT) SL tablet 0 4 mg   Dose: 0 4 mg  Freq: Every 5 minutes PRN Route: SL  PRN Reason: chest pain  Start: 03/24/21 0519 End: 03/24/21 1345    1345-D/C'd      ondansetron (ZOFRAN) injection 4 mg   Dose: 4 mg  Freq: Every 6 hours PRN Route: IV  PRN Reasons: nausea,vomiting  Start: 03/24/21 0519 End: 03/24/21 0731    0614-D/C'd      oxyCODONE (ROXICODONE) immediate release tablet 10 mg   Dose: 10 mg  Freq: Every 4 hours PRN Route: PO  PRN Reason: severe pain  Start: 03/24/21 0519 End: 03/24/21 1345    1345-D/C'd      oxyCODONE (ROXICODONE) IR tablet 5 mg   Dose: 5 mg  Freq: Every 4 hours PRN Route: PO  PRN Reason: moderate pain  Start: 03/24/21 0519 End: 03/24/21 1345    1345-D/C'd      senna (SENOKOT) tablet 8 6 mg   Dose: 1 tablet  Freq: Daily at bedtime PRN Route: PO  PRN Reason: constipation  Indications of Use: CONSTIPATION  Start: 03/24/21 0519 End: 03/24/21 1345    1345-D/C'd      simethicone (MYLICON) chewable tablet 80 mg   Dose: 80 mg  Freq: 4 times daily PRN Route: PO  PRN Reason: flatulence  Start: 03/24/21 0519 End: 03/24/21 1345    1345-D/C'd      Medications 03/24     IP CONSULT TO CARDIOLOGY    Network Utilization Review Department  ATTENTION: Please call with any questions or concerns to [phone] and carefully listen to the prompts so that you are directed to the right person  All voicemails are confidential   Vane Herrera all requests for admission clinical reviews, approved or denied determinations and any other requests to dedicated fax number below belonging to the campus where the patient is receiving treatment   List of dedicated fax numbers for the Facilities:  [address] DENIALS (Administrative/Medical Necessity) [phone]   [address] [place] [address] (Maternity/NICU/Pediatrics) [address] [place] [address],[address] [facility] [address] ([phone]) [phone] [place] [address] [address] [phone]   [place] [address] [phone]

## 2021-04-07 ENCOUNTER — TELEPHONE (OUTPATIENT)
Dept: NEPHROLOGY | Facility: CLINIC | Age: 46
End: 2021-04-07

## 2021-04-07 NOTE — TELEPHONE ENCOUNTER
I attempted to call patient to verify insurance for 4/9 appt w/ Dr Job Mcadams, but patient's number is a non working number and patient does not have an emergency contact listed
